# Patient Record
Sex: MALE | Race: WHITE | NOT HISPANIC OR LATINO | Employment: UNEMPLOYED | URBAN - METROPOLITAN AREA
[De-identification: names, ages, dates, MRNs, and addresses within clinical notes are randomized per-mention and may not be internally consistent; named-entity substitution may affect disease eponyms.]

---

## 2021-08-30 ENCOUNTER — OFFICE VISIT (OUTPATIENT)
Dept: INTERNAL MEDICINE | Facility: CLINIC | Age: 41
End: 2021-08-30

## 2021-08-30 VITALS
WEIGHT: 210 LBS | HEIGHT: 67 IN | HEART RATE: 67 BPM | BODY MASS INDEX: 32.96 KG/M2 | SYSTOLIC BLOOD PRESSURE: 130 MMHG | RESPIRATION RATE: 16 BRPM | OXYGEN SATURATION: 98 % | DIASTOLIC BLOOD PRESSURE: 84 MMHG | TEMPERATURE: 97.5 F

## 2021-08-30 DIAGNOSIS — E66.09 CLASS 1 OBESITY DUE TO EXCESS CALORIES WITHOUT SERIOUS COMORBIDITY WITH BODY MASS INDEX (BMI) OF 32.0 TO 32.9 IN ADULT: ICD-10-CM

## 2021-08-30 DIAGNOSIS — F41.9 ANXIETY: ICD-10-CM

## 2021-08-30 DIAGNOSIS — R03.0 ELEVATED BLOOD PRESSURE READING: ICD-10-CM

## 2021-08-30 DIAGNOSIS — M25.561 LATERAL KNEE PAIN, RIGHT: Primary | ICD-10-CM

## 2021-08-30 PROCEDURE — 99203 OFFICE O/P NEW LOW 30 MIN: CPT | Performed by: NURSE PRACTITIONER

## 2021-08-30 RX ORDER — CITALOPRAM 20 MG/1
20 TABLET ORAL DAILY
COMMUNITY
Start: 2021-07-27 | End: 2021-10-04 | Stop reason: SDUPTHER

## 2021-08-30 RX ORDER — BUPRENORPHINE AND NALOXONE 8; 2 MG/1; MG/1
FILM, SOLUBLE BUCCAL; SUBLINGUAL DAILY
COMMUNITY
Start: 2021-08-24

## 2021-08-30 NOTE — PATIENT INSTRUCTIONS
Acute Knee Pain, Adult  Acute knee pain is sudden and may be caused by damage, swelling, or irritation of the muscles and tissues that support your knee. The injury may result from:  · A fall.  · An injury to your knee from twisting motions.  · A hit to the knee.  · Infection.  Acute knee pain may go away on its own with time and rest. If it does not, your health care provider may order tests to find the cause of the pain. These may include:  · Imaging tests, such as an X-ray, MRI, or ultrasound.  · Joint aspiration. In this test, fluid is removed from the knee.  · Arthroscopy. In this test, a lighted tube is inserted into the knee and an image is projected onto a TV screen.  · Biopsy. In this test, a sample of tissue is removed from the body and studied under a microscope.  Follow these instructions at home:  Pay attention to any changes in your symptoms. Take these actions to relieve your pain.  If you have a knee sleeve or brace:    · Wear the sleeve or brace as told by your health care provider. Remove it only as told by your health care provider.  · Loosen the sleeve or brace if your toes tingle, become numb, or turn cold and blue.  · Keep the sleeve or brace clean.  · If the sleeve or brace is not waterproof:  ? Do not let it get wet.  ? Cover it with a watertight covering when you take a bath or shower.  Activity  · Rest your knee.  · Do not do things that cause pain or make pain worse.  · Avoid high-impact activities or exercises, such as running, jumping rope, or doing jumping jacks.  · Work with a physical therapist to make a safe exercise program, as recommended by your health care provider. Do exercises as told by your physical therapist.  Managing pain, stiffness, and swelling    · If directed, put ice on the knee:  ? Put ice in a plastic bag.  ? Place a towel between your skin and the bag.  ? Leave the ice on for 20 minutes, 2-3 times a day.  · If directed, use an elastic bandage to put pressure  (compression) on your injured knee. This may control swelling, give support, and help with discomfort.  General instructions  · Take over-the-counter and prescription medicines only as told by your health care provider.  · Raise (elevate) your knee above the level of your heart when you are sitting or lying down.  · Sleep with a pillow under your knee.  · Do not use any products that contain nicotine or tobacco, such as cigarettes, e-cigarettes, and chewing tobacco. These can delay healing. If you need help quitting, ask your health care provider.  · If you are overweight, work with your health care provider and a dietitian to set a weight-loss goal that is healthy and reasonable for you. Extra weight can put pressure on your knee.  · Keep all follow-up visits as told by your health care provider. This is important.  Contact a health care provider if:  · Your knee pain continues, changes, or gets worse.  · You have a fever along with knee pain.  · Your knee feels warm to the touch.  · Your knee gonzalez or locks up.  Get help right away if:  · Your knee swells, and the swelling becomes worse.  · You cannot move your knee.  · You have severe pain in your knee.  Summary  · Acute knee pain can be caused by a fall, an injury, an infection, or damage, swelling, or irritation of the tissues that support your knee.  · Your health care provider may perform tests to find out the cause of the pain.  · Pay attention to any changes in your symptoms. Relieve your pain with rest, medicines, light activity, and use of ice.  · Get help if your pain continues or becomes worse, your knee swells, or you cannot move your knee.  This information is not intended to replace advice given to you by your health care provider. Make sure you discuss any questions you have with your health care provider.  Document Revised: 05/30/2019 Document Reviewed: 05/30/2019  [employer] Patient Education © 2021 [employer]    Obesity, Adult  Obesity is  having too much body fat. Being obese means that your weight is more than what is healthy for you.  BMI is a number that explains how much body fat you have. If you have a BMI of 30 or more, you are obese. Obesity is often caused by eating or drinking more calories than your body uses. Changing your lifestyle can help you lose weight.  Obesity can cause serious health problems, such as:  · Stroke.  · Coronary artery disease (CAD).  · Type 2 diabetes.  · Some types of cancer, including cancers of the colon, breast, uterus, and gallbladder.  · Osteoarthritis.  · High blood pressure (hypertension).  · High cholesterol.  · Sleep apnea.  · Gallbladder stones.  · Infertility problems.  What are the causes?  · Eating meals each day that are high in calories, sugar, and fat.  · Being born with genes that may make you more likely to become obese.  · Having a medical condition that causes obesity.  · Taking certain medicines.  · Sitting a lot (having a sedentary lifestyle).  · Not getting enough sleep.  · Drinking a lot of drinks that have sugar in them.  What increases the risk?  · Having a family history of obesity.  · Being an  woman.  · Being a  man.  · Living in an area with limited access to:  ? Fine, recreation centers, or sidewalks.  ? Healthy food choices, such as grocery stores and [employer] markets.  What are the signs or symptoms?  The main sign is having too much body fat.  How is this treated?  · Treatment for this condition often includes changing your lifestyle. Treatment may include:  ? Changing your diet. This may include making a healthy meal plan.  ? Exercise. This may include activity that causes your heart to beat faster (aerobic exercise) and strength training. Work with your doctor to design a program that works for you.  ? Medicine to help you lose weight. This may be used if you are not able to lose 1 pound a week after 6 weeks of healthy eating and more exercise.  ? Treating  conditions that cause the obesity.  ? Surgery. Options may include gastric banding and gastric bypass. This may be done if:  § Other treatments have not helped to improve your condition.  § You have a BMI of 40 or higher.  § You have life-threatening health problems related to obesity.  Follow these instructions at home:  Eating and drinking    · Follow advice from your doctor about what to eat and drink. Your doctor may tell you to:  ? Limit fast food, sweets, and processed snack foods.  ? Choose low-fat options. For example, choose low-fat milk instead of whole milk.  ? Eat 5 or more servings of fruits or vegetables each day.  ? Eat at home more often. This gives you more control over what you eat.  ? Choose healthy foods when you eat out.  ? Learn to read food labels. This will help you learn how much food is in 1 serving.  ? Keep low-fat snacks available.  ? Avoid drinks that have a lot of sugar in them. These include soda, fruit juice, iced tea with sugar, and flavored milk.  · Drink enough water to keep your pee (urine) pale yellow.  · Do not go on fad diets.  Physical activity  · Exercise often, as told by your doctor. Most adults should get up to 150 minutes of moderate-intensity exercise every week.Ask your doctor:  ? What types of exercise are safe for you.  ? How often you should exercise.  · Warm up and stretch before being active.  · Do slow stretching after being active (cool down).  · Rest between times of being active.  Lifestyle  · Work with your doctor and a food expert (dietitian) to set a weight-loss goal that is best for you.  · Limit your screen time.  · Find ways to reward yourself that do not involve food.  · Do not drink alcohol if:  ? Your doctor tells you not to drink.  ? You are pregnant, may be pregnant, or are planning to become pregnant.  · If you drink alcohol:  ? Limit how much you use to:  § 0-1 drink a day for women.  § 0-2 drinks a day for men.  ? Be aware of how much alcohol is  in your drink. In the [place] one drink equals one 12 oz bottle of beer (355 mL), one 5 oz glass of wine (148 mL), or one 1½ oz glass of hard liquor (44 mL).  General instructions  · Keep a weight-loss journal. This can help you keep track of:  ? The food that you eat.  ? How much exercise you get.  · Take over-the-counter and prescription medicines only as told by your doctor.  · Take vitamins and supplements only as told by your doctor.  · Think about joining a support group.  · Keep all follow-up visits as told by your doctor. This is important.  Contact a doctor if:  · You cannot meet your weight loss goal after you have changed your diet and lifestyle for 6 weeks.  Get help right away if you:  · Are having trouble breathing.  · Are having thoughts of harming yourself.  Summary  · Obesity is having too much body fat.  · Being obese means that your weight is more than what is healthy for you.  · Work with your doctor to set a weight-loss goal.  · Get regular exercise as told by your doctor.  This information is not intended to replace advice given to you by your health care provider. Make sure you discuss any questions you have with your health care provider.  Document Revised: 08/22/2019 Document Reviewed: 08/22/2019  Elsevier Patient Education © 2021 [employer]

## 2021-08-30 NOTE — PROGRESS NOTES
New Patient Office Visit      Patient Name: Abhishek Brand  : 1980   MRN: [id number]     Chief Complaint:    Chief Complaint   Patient presents with   • Establish Care     New patient    • Knee Pain     Putting in yannick, felt a pop, immediate pain - On Thursday    • Anxiety     Worsening when lays down at night        History of Present Illness: Abhishek Brand is a 41 y.o. male presents to clinic today to establish care.  He was a former smoker quit 5 years ago.  He presents with a history of anxiety and substance abuse.  He has been on Suboxone for approximately 2 years and has been doing well.  He describes feeling anxious more at night while trying to to fall asleep. This usually goes away within minutes.  Has noticed a fast heart rate at this time.  He does not experience experience any chest pain or palpitations while during the day when he is active.  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  Alleviating factors include ibuprofen, ice, ace bandage and he has been wearing a  knee brace.  He states that the pain is improving.  Associated symptoms are slight swelling of the knee, lateral pain of knee, slight swelling in his calf, and difficulty with extension.  He has a history of Osgood -Schlatter's disease in that knee.  Pain is rated as a 2 and described as sore.     Subjective     Review of System: Review of Systems   Constitutional: Negative for activity change, appetite change, chills, fatigue, fever and unexpected weight change.   HENT: Negative for congestion, ear pain, postnasal drip, rhinorrhea, sinus pressure, sinus pain, sneezing and sore throat.    Eyes: Negative for visual disturbance.   Respiratory: Negative for cough, shortness of breath and wheezing.    Cardiovascular: Negative for chest pain and palpitations.   Gastrointestinal: Negative for  abdominal pain, blood in stool, constipation, diarrhea, nausea and vomiting.   Genitourinary: Negative for dysuria.   Musculoskeletal: Positive for joint swelling. Negative for arthralgias and myalgias.   Skin: Negative for rash.   Neurological: Negative for dizziness, weakness and headaches.   Hematological: Negative for adenopathy.   Psychiatric/Behavioral: Negative for dysphoric mood. The patient is nervous/anxious.       I have reviewed the ROS documented by my clinical staff, updated appropriately and I agree. ANAYELI Padilla    Past Medical History:   Past Medical History:   Diagnosis Date   • Anxiety    • Depression        Past Surgical History:   Past Surgical History:   Procedure Laterality Date   • APPENDECTOMY     • CHOLECYSTECTOMY         Family History:   Family History   Problem Relation Age of Onset   • Cancer Father         Throat    • Colon polyps Sister    • Cancer Paternal Uncle         Colon   • Cancer Maternal Grandmother    • Heart disease Maternal Grandfather        Social History:   Social History     Socioeconomic History   • Marital status:      Spouse name: Not on file   • Number of children: Not on file   • Years of education: Not on file   • Highest education level: Not on file   Tobacco Use   • Smoking status: Former Smoker     Types: Cigarettes   • Smokeless tobacco: Never Used   • Tobacco comment:  Quit 5 yrs ago    Vaping Use   • Vaping Use: Never used   Substance and Sexual Activity   • Alcohol use: Not Currently     Comment: Former- Stopped around 2015   • Drug use: Not Currently     Comment: Opioids - Stopped 10/2018       Medications:     Current Outpatient Medications:   •  buprenorphine-naloxone (SUBOXONE) 8-2 MG film film, Daily., Disp: , Rfl:   •  citalopram (CeleXA) 20 MG tablet, Take 20 mg by mouth Daily., Disp: , Rfl:     Allergies:   No Known Allergies    Objective     Physical Exam:   Vital Signs:   Vitals:    08/30/21 1302   BP: 130/84   Pulse: 67   Resp:  "16   Temp: 97.5 °F (36.4 °C)   TempSrc: Infrared   SpO2: 98%   Weight: 95.3 kg (210 lb)   Height: 170.2 cm (67\")   PainSc:   2   PainLoc: Knee  Comment: Right     Body mass index is 32.89 kg/m².     Physical Exam  Constitutional:       Appearance: Normal appearance.   HENT:      Head: Normocephalic.   Eyes:      General:         Right eye: No discharge.         Left eye: No discharge.   Cardiovascular:      Rate and Rhythm: Normal rate and regular rhythm.      Heart sounds: Normal heart sounds. No murmur heard.     Pulmonary:      Breath sounds: Normal breath sounds. No wheezing, rhonchi or rales.   Abdominal:      General: Bowel sounds are normal.      Palpations: There is no mass.   Musculoskeletal:      Right lower leg: No edema.      Left lower leg: No edema.      Comments: Right lateral upper knee tenderness and swelling; pain with extension; no instability; negative madhuri sign; slight swelling of right calf   Lymphadenopathy:      Cervical: No cervical adenopathy.   Skin:     General: Skin is warm and dry.   Neurological:      General: No focal deficit present.      Mental Status: He is alert and oriented to person, place, and time.   Psychiatric:         Mood and Affect: Mood normal.         Behavior: Behavior normal.         Assessment / Plan      Assessment/Plan:   Diagnoses and all orders for this visit:    1. Lateral knee pain, right (Primary)    2. Anxiety    3. BMI 32.0-32.9,adult    4. Class 1 obesity due to excess calories without serious comorbidity with body mass index (BMI) of 32.0 to 32.9 in adult    5. Elevated blood pressure reading         Immunizations:Needs Tdap and Covid vaccine.  Discussed getting him up-to-date at next physical.    Colonoscopy: Last colonoscopy was 2 years ago.    Doing well on citalopram.  He does not need any refills today.    Discussed diet and exercise.  Will check labs at his physical.    Monitor blood pressure; limit salt.     Patient to monitor her right knee pain; " will consider x-ray and/or physical therapy if no improvement.  Also monitor right left calf swelling and let me know if this worsens becomes extremely painful.  Patient can try heat and continue anti-inflammatories.      Return in about 3 months (around 11/30/2021) for Annual.    ANAYELI Padilla  [facility]

## 2021-10-04 ENCOUNTER — TELEPHONE (OUTPATIENT)
Dept: INTERNAL MEDICINE | Facility: CLINIC | Age: 41
End: 2021-10-04

## 2021-10-04 ENCOUNTER — OFFICE VISIT (OUTPATIENT)
Dept: INTERNAL MEDICINE | Facility: CLINIC | Age: 41
End: 2021-10-04

## 2021-10-04 ENCOUNTER — LAB (OUTPATIENT)
Dept: LAB | Facility: HOSPITAL | Age: 41
End: 2021-10-04

## 2021-10-04 VITALS
HEIGHT: 68 IN | OXYGEN SATURATION: 97 % | SYSTOLIC BLOOD PRESSURE: 116 MMHG | RESPIRATION RATE: 16 BRPM | HEART RATE: 67 BPM | BODY MASS INDEX: 31.58 KG/M2 | TEMPERATURE: 97.5 F | DIASTOLIC BLOOD PRESSURE: 82 MMHG | WEIGHT: 208.4 LBS

## 2021-10-04 DIAGNOSIS — Z13.220 ENCOUNTER FOR LIPID SCREENING FOR CARDIOVASCULAR DISEASE: ICD-10-CM

## 2021-10-04 DIAGNOSIS — F32.A ANXIETY AND DEPRESSION: ICD-10-CM

## 2021-10-04 DIAGNOSIS — Z00.00 WELLNESS EXAMINATION: Primary | ICD-10-CM

## 2021-10-04 DIAGNOSIS — Z13.6 ENCOUNTER FOR LIPID SCREENING FOR CARDIOVASCULAR DISEASE: ICD-10-CM

## 2021-10-04 DIAGNOSIS — M25.561 ACUTE PAIN OF RIGHT KNEE: ICD-10-CM

## 2021-10-04 DIAGNOSIS — Z00.00 WELLNESS EXAMINATION: ICD-10-CM

## 2021-10-04 DIAGNOSIS — E66.09 CLASS 1 OBESITY DUE TO EXCESS CALORIES WITHOUT SERIOUS COMORBIDITY WITH BODY MASS INDEX (BMI) OF 31.0 TO 31.9 IN ADULT: ICD-10-CM

## 2021-10-04 DIAGNOSIS — F41.9 ANXIETY AND DEPRESSION: ICD-10-CM

## 2021-10-04 DIAGNOSIS — Z12.5 SCREENING FOR PROSTATE CANCER: ICD-10-CM

## 2021-10-04 PROBLEM — E66.811 CLASS 1 OBESITY DUE TO EXCESS CALORIES WITHOUT SERIOUS COMORBIDITY WITH BODY MASS INDEX (BMI) OF 31.0 TO 31.9 IN ADULT: Status: ACTIVE | Noted: 2021-10-04

## 2021-10-04 LAB
ALBUMIN SERPL-MCNC: 5 G/DL (ref 3.5–5.2)
ALBUMIN/GLOB SERPL: 2.3 G/DL
ALP SERPL-CCNC: 143 U/L (ref 39–117)
ALT SERPL W P-5'-P-CCNC: 79 U/L (ref 1–41)
ANION GAP SERPL CALCULATED.3IONS-SCNC: 9.1 MMOL/L (ref 5–15)
AST SERPL-CCNC: 39 U/L (ref 1–40)
BASOPHILS # BLD AUTO: 0.04 10*3/MM3 (ref 0–0.2)
BASOPHILS NFR BLD AUTO: 0.6 % (ref 0–1.5)
BILIRUB SERPL-MCNC: 0.3 MG/DL (ref 0–1.2)
BUN SERPL-MCNC: 12 MG/DL (ref 6–20)
BUN/CREAT SERPL: 9.9 (ref 7–25)
CALCIUM SPEC-SCNC: 10.1 MG/DL (ref 8.6–10.5)
CHLORIDE SERPL-SCNC: 105 MMOL/L (ref 98–107)
CHOLEST SERPL-MCNC: 169 MG/DL (ref 0–200)
CO2 SERPL-SCNC: 27.9 MMOL/L (ref 22–29)
CREAT SERPL-MCNC: 1.21 MG/DL (ref 0.76–1.27)
DEPRECATED RDW RBC AUTO: 44.7 FL (ref 37–54)
EOSINOPHIL # BLD AUTO: 0.17 10*3/MM3 (ref 0–0.4)
EOSINOPHIL NFR BLD AUTO: 2.5 % (ref 0.3–6.2)
ERYTHROCYTE [DISTWIDTH] IN BLOOD BY AUTOMATED COUNT: 13.3 % (ref 12.3–15.4)
GFR SERPL CREATININE-BSD FRML MDRD: 66 ML/MIN/1.73
GLOBULIN UR ELPH-MCNC: 2.2 GM/DL
GLUCOSE SERPL-MCNC: 92 MG/DL (ref 65–99)
HCT VFR BLD AUTO: 44.1 % (ref 37.5–51)
HDLC SERPL-MCNC: 35 MG/DL (ref 40–60)
HGB BLD-MCNC: 14.3 G/DL (ref 13–17.7)
IMM GRANULOCYTES # BLD AUTO: 0.03 10*3/MM3 (ref 0–0.05)
IMM GRANULOCYTES NFR BLD AUTO: 0.4 % (ref 0–0.5)
LDLC SERPL CALC-MCNC: 99 MG/DL (ref 0–100)
LDLC/HDLC SERPL: 2.66 {RATIO}
LYMPHOCYTES # BLD AUTO: 1.8 10*3/MM3 (ref 0.7–3.1)
LYMPHOCYTES NFR BLD AUTO: 26.9 % (ref 19.6–45.3)
MCH RBC QN AUTO: 29.5 PG (ref 26.6–33)
MCHC RBC AUTO-ENTMCNC: 32.4 G/DL (ref 31.5–35.7)
MCV RBC AUTO: 90.9 FL (ref 79–97)
MONOCYTES # BLD AUTO: 0.46 10*3/MM3 (ref 0.1–0.9)
MONOCYTES NFR BLD AUTO: 6.9 % (ref 5–12)
NEUTROPHILS NFR BLD AUTO: 4.18 10*3/MM3 (ref 1.7–7)
NEUTROPHILS NFR BLD AUTO: 62.7 % (ref 42.7–76)
NRBC BLD AUTO-RTO: 0 /100 WBC (ref 0–0.2)
PLATELET # BLD AUTO: 247 10*3/MM3 (ref 140–450)
PMV BLD AUTO: 11.6 FL (ref 6–12)
POTASSIUM SERPL-SCNC: 4.5 MMOL/L (ref 3.5–5.2)
PROT SERPL-MCNC: 7.2 G/DL (ref 6–8.5)
RBC # BLD AUTO: 4.85 10*6/MM3 (ref 4.14–5.8)
SODIUM SERPL-SCNC: 142 MMOL/L (ref 136–145)
TRIGL SERPL-MCNC: 205 MG/DL (ref 0–150)
TSH SERPL DL<=0.05 MIU/L-ACNC: 1.47 UIU/ML (ref 0.27–4.2)
VLDLC SERPL-MCNC: 35 MG/DL (ref 5–40)
WBC # BLD AUTO: 6.68 10*3/MM3 (ref 3.4–10.8)

## 2021-10-04 PROCEDURE — 84443 ASSAY THYROID STIM HORMONE: CPT

## 2021-10-04 PROCEDURE — 80053 COMPREHEN METABOLIC PANEL: CPT

## 2021-10-04 PROCEDURE — 85025 COMPLETE CBC W/AUTO DIFF WBC: CPT

## 2021-10-04 PROCEDURE — 80061 LIPID PANEL: CPT

## 2021-10-04 PROCEDURE — 99396 PREV VISIT EST AGE 40-64: CPT | Performed by: NURSE PRACTITIONER

## 2021-10-04 RX ORDER — CITALOPRAM 20 MG/1
20 TABLET ORAL DAILY
Qty: 90 TABLET | Refills: 4 | Status: SHIPPED | OUTPATIENT
Start: 2021-10-04 | End: 2022-07-11

## 2021-10-04 NOTE — PROGRESS NOTES
Male Physical Note      Patient Name: Abhishek Brand  : 1980   MRN: [id number]     Chief Complaint:    Chief Complaint   Patient presents with   • Annual Exam   • Knee Pain     right       History of Present Illness: Abhishek Brand is a 41 y.o. male who is here today for their annual health maintenance and physical.      Anxiety: Doing well on citalopram. History of opoid dependency in remission with suboxone therapy.     Palpitations: Patient states he has a  fast heart rate when he lies down. Improves on its own and then he falls asleep. States his caffeine in take is several cups of coffee in the morning and a caffeinated soda in the afternoon. He does not experience experience any chest pain or palpitations while active.    Right knee pain: complains of some instability at times especially with lateral motions; knee brace helps.         Subjective      Review of Systems:   Review of Systems   Constitutional: Negative for activity change, appetite change, fatigue, fever, unexpected weight gain and unexpected weight loss.   HENT: Negative for congestion.    Eyes: Negative for visual disturbance.   Respiratory: Negative for cough, chest tightness, shortness of breath and wheezing.         Heart racing when trying to go to sleep.    Cardiovascular: Positive for palpitations. Negative for chest pain and leg swelling.   Gastrointestinal: Negative for abdominal pain, constipation, diarrhea, nausea, rectal pain, vomiting, GERD and indigestion.   Endocrine: Negative for cold intolerance, heat intolerance, polydipsia, polyphagia and polyuria.   Genitourinary: Negative for dysuria, frequency, hematuria, nocturia, erectile dysfunction, testicular pain and urgency.   Musculoskeletal: Positive for arthralgias. Negative for joint swelling.        Right knee pain   Skin: Negative for rash.   Neurological: Negative for headache.   Psychiatric/Behavioral: Positive for sleep disturbance. Negative for depressed mood. The  patient is not nervous/anxious.      Caffeine: coffee and one soda; none after 4 pm  Sleep: 7 hours  Exercise: 1-2 hours/day with  push-ups and squats      Past Medical History:   Past Medical History:   Diagnosis Date   • Anxiety    • Depression        Past Surgical History:   Past Surgical History:   Procedure Laterality Date   • APPENDECTOMY     • CHOLECYSTECTOMY         Family History:   Family History   Problem Relation Age of Onset   • Cancer Father         Throat    • Colon polyps Sister    • Cancer Paternal Uncle         Colon   • Cancer Maternal Grandmother    • Heart disease Maternal Grandfather        Social History:   Social History     Socioeconomic History   • Marital status:      Spouse name: Not on file   • Number of children: Not on file   • Years of education: Not on file   • Highest education level: Not on file   Tobacco Use   • Smoking status: Former Smoker     Types: Cigarettes   • Smokeless tobacco: Never Used   • Tobacco comment:  Quit 5 yrs ago    Vaping Use   • Vaping Use: Never used   Substance and Sexual Activity   • Alcohol use: Not Currently     Comment: Former- Stopped around 2015   • Drug use: Not Currently     Types: Oxycodone     Comment: Opioids - Stopped 10/2018   • Sexual activity: Yes     Partners: Female     Birth control/protection: Partner's vasectomy       Medications:     Current Outpatient Medications:   •  buprenorphine-naloxone (SUBOXONE) 8-2 MG film film, Daily., Disp: , Rfl:   •  citalopram (CeleXA) 20 MG tablet, Take 20 mg by mouth Daily., Disp: , Rfl:     Allergies:   No Known Allergies    Immunizations:  Td/Tdap(Booster Q 10 yrs):  Decline  Flu (Yearly):  Decline  Pneumovax (1 yr after Prevnar):  n/a  [id number] (1 yr after Pneumo):n/a  Colorectal Screening: Had a colonoscopy 3 years: [facility] /f/u 5 years; sister had a history of polps   Last Completed Colonoscopy     This patient has no relevant Health Maintenance data.         CT for Smoker (Age  "55-75, 30pk yr): n/a  Bone Density/DEXA: n/a   Hep C ( 4102-8827):  Declined  PSA(Over age 50):  N/a  US Aorta (For male smokers, age 65):  n/a    Diet/Physical activity:Exercise: 1-2 hours push-ups and squats    Sexual health: active  Depression: PHQ-2 Depression Screening PHQ-2 Depression Screening  Little interest or pleasure in doing things? 0   Feeling down, depressed, or hopeless? 0   PHQ-2 Total Score 0      0    0    0          Objective     Physical Exam:  Vital Signs:   Vitals:    10/04/21 0914   BP: 116/82   BP Location: Right arm   Patient Position: Sitting   Cuff Size: Adult   Pulse: 67   Resp: 16   Temp: 97.5 °F (36.4 °C)   TempSrc: Infrared   SpO2: 97%   Weight: 94.5 kg (208 lb 6.4 oz)   Height: 172.1 cm (67.75\")   PainSc: 0-No pain     Body mass index is 31.92 kg/m². Patient's Body mass index is 31.92 kg/m². indicating that he is obese (BMI >30). Obesity-related health conditions include the following: none. Obesity is newly identified. BMI is is above average; BMI management plan is completed. We discussed portion control and increasing exercise..      Physical Exam  Vitals and nursing note reviewed.   Constitutional:       General: He is not in acute distress.     Appearance: Normal appearance. He is not ill-appearing.   HENT:      Head: Normocephalic.      Right Ear: There is no impacted cerumen.      Left Ear: There is no impacted cerumen.      Nose: No septal deviation, nasal tenderness or rhinorrhea.      Mouth/Throat:      Mouth: Mucous membranes are moist.      Pharynx: Oropharynx is clear. No oropharyngeal exudate or posterior oropharyngeal erythema.   Eyes:      General:         Right eye: No discharge.         Left eye: No discharge.      Extraocular Movements: Extraocular movements intact.      Conjunctiva/sclera: Conjunctivae normal.      Pupils: Pupils are equal, round, and reactive to light.   Neck:      Thyroid: No thyromegaly.      Vascular: No carotid bruit.   Cardiovascular:     "  Rate and Rhythm: Normal rate and regular rhythm.      Pulses: Normal pulses.      Heart sounds: Normal heart sounds. No murmur heard.   No gallop.    Pulmonary:      Effort: Pulmonary effort is normal.      Breath sounds: Normal breath sounds. No wheezing, rhonchi or rales.   Abdominal:      General: Bowel sounds are normal.      Palpations: Abdomen is soft. There is no mass.      Tenderness: There is no abdominal tenderness. There is no right CVA tenderness, left CVA tenderness or rebound.   Genitourinary:     Comments: declined  Musculoskeletal:         General: No swelling or tenderness.      Cervical back: Normal range of motion.      Right lower leg: No edema.      Left lower leg: No edema.      Comments: No tenderness in right knee; no instability; full ROM   Lymphadenopathy:      Cervical: No cervical adenopathy.   Skin:     General: Skin is warm and dry.      Capillary Refill: Capillary refill takes less than 2 seconds.      Findings: No erythema or rash.   Neurological:      General: No focal deficit present.      Mental Status: He is alert and oriented to person, place, and time.      Motor: No weakness.   Psychiatric:         Mood and Affect: Mood normal.         Behavior: Behavior is cooperative.         Thought Content: Thought content normal.         Cognition and Memory: He does not exhibit impaired recent memory.         Judgment: Judgment normal.         Procedures    Assessment / Plan      Assessment/Plan:   Diagnoses and all orders for this visit:    1. Wellness examination (Primary)  -     Comprehensive Metabolic Panel; Future  -     TSH; Future  -     CBC & Differential; Future    2. Anxiety and depression  Continue medications  Citalopram 20 mg daily     3.  Screening for prostate cancer  PSA    5. Encounter for lipid screening for cardiovascular disease  Lipid panel    6. Right knee pain: will consider xray or PT if no improvement     7. BMI 31; goal bmi 25    8. Obesity: discussed exercise  and diet     Follow Up:   1 year or sooner if worsening      Healthcare Maintenance:   Counseling provided on flu, tdap, and covid vaccines, diet and exercise, weight loss, dental and eye exams.   Abhishek Brand voices understanding and acceptance of this advice and will call back with any further questions or concerns. AVS with preventive healthcare tips printed for patient.     ANAYELI Padilla   [facility]

## 2022-06-15 ENCOUNTER — TELEPHONE (OUTPATIENT)
Dept: INTERNAL MEDICINE | Facility: CLINIC | Age: 42
End: 2022-06-15

## 2022-06-15 NOTE — TELEPHONE ENCOUNTER
Caller: NILSA DAVIS    Relationship: Emergency Contact    Best call back number: [phone]    Who is your current provider: KELSEA MICHEL    Who would you like your new provider to be: DR. RODRÍGUEZ     What are your reasons for transferring care: PATIENT PREFERENCE    Additional notes: PATIENT IS ASKING TO BE SCHEDULED FOR SIDE PAIN AND RECHECK LABS

## 2022-06-15 NOTE — TELEPHONE ENCOUNTER
Patient's wife states he didn't have a good experience with Jossie and she talked to Dr Reddy and Dr Reddy agreed to see him. Patient's wife see's Dr Reddy.

## 2022-07-11 ENCOUNTER — OFFICE VISIT (OUTPATIENT)
Dept: INTERNAL MEDICINE | Facility: CLINIC | Age: 42
End: 2022-07-11

## 2022-07-11 VITALS
HEART RATE: 78 BPM | BODY MASS INDEX: 31.13 KG/M2 | OXYGEN SATURATION: 99 % | RESPIRATION RATE: 18 BRPM | SYSTOLIC BLOOD PRESSURE: 122 MMHG | DIASTOLIC BLOOD PRESSURE: 76 MMHG | HEIGHT: 68 IN | WEIGHT: 205.4 LBS | TEMPERATURE: 97.2 F

## 2022-07-11 DIAGNOSIS — N30.01 ACUTE CYSTITIS WITH HEMATURIA: Primary | ICD-10-CM

## 2022-07-11 DIAGNOSIS — L40.9 PSORIASIS: ICD-10-CM

## 2022-07-11 DIAGNOSIS — N39.9 URINARY PROBLEM IN MALE: ICD-10-CM

## 2022-07-11 DIAGNOSIS — R31.9 HEMATURIA, UNSPECIFIED TYPE: ICD-10-CM

## 2022-07-11 DIAGNOSIS — G47.00 INSOMNIA, UNSPECIFIED TYPE: ICD-10-CM

## 2022-07-11 LAB
BACTERIA UR QL AUTO: NORMAL /HPF
BILIRUB BLD-MCNC: NEGATIVE MG/DL
CLARITY, POC: CLEAR
COLOR UR: YELLOW
EXPIRATION DATE: ABNORMAL
GLUCOSE UR STRIP-MCNC: NEGATIVE MG/DL
HYALINE CASTS UR QL AUTO: NORMAL /LPF
KETONES UR QL: NEGATIVE
LEUKOCYTE EST, POC: NEGATIVE
Lab: ABNORMAL
NITRITE UR-MCNC: NEGATIVE MG/ML
PH UR: 7 [PH] (ref 5–8)
PROT UR STRIP-MCNC: NEGATIVE MG/DL
RBC # UR STRIP: ABNORMAL /UL
RBC # UR STRIP: NORMAL /HPF
REF LAB TEST METHOD: NORMAL
SP GR UR: 1 (ref 1–1.03)
SQUAMOUS #/AREA URNS HPF: NORMAL /HPF
UROBILINOGEN UR QL: NORMAL
WBC # UR STRIP: NORMAL /HPF

## 2022-07-11 PROCEDURE — 81015 MICROSCOPIC EXAM OF URINE: CPT | Performed by: NURSE PRACTITIONER

## 2022-07-11 PROCEDURE — 99213 OFFICE O/P EST LOW 20 MIN: CPT | Performed by: NURSE PRACTITIONER

## 2022-07-11 PROCEDURE — 81003 URINALYSIS AUTO W/O SCOPE: CPT | Performed by: NURSE PRACTITIONER

## 2022-07-11 RX ORDER — HYDROXYZINE HYDROCHLORIDE 25 MG/1
TABLET, FILM COATED ORAL
Qty: 60 TABLET | Refills: 0 | Status: SHIPPED | OUTPATIENT
Start: 2022-07-11 | End: 2022-10-27 | Stop reason: ALTCHOICE

## 2022-07-11 RX ORDER — SULFAMETHOXAZOLE AND TRIMETHOPRIM 800; 160 MG/1; MG/1
1 TABLET ORAL 2 TIMES DAILY
Qty: 20 TABLET | Refills: 0 | Status: SHIPPED | OUTPATIENT
Start: 2022-07-11 | End: 2022-09-13

## 2022-07-11 NOTE — PROGRESS NOTES
"Patient Name: Abhishek Brand  : 1980   MRN: [id number]     Chief Complaint:    Chief Complaint   Patient presents with   • Abdominal Pain     Right lower abdominal pain. Seen at [facility] 22   • Anxiety     At night     • rash on leg   • urinary problem       History of Present Illness: Abhishek Brand is a 42 y.o. male presents to clinic for right lower abdominal pain. He was seen at [facility] 22. He was told he had blood in urine. He continues to have slight RLQ pain.  He describes the pain as an ache.  Initially had nausea, vomiting and diarrhea.  The nausea vomiting and diarrhea have improved.  Associated symptoms are straining to urinate.  Patient states he has to strain to urinate for one year and seems to be worsening. He has slight burning, frequency, and hesitancy.  I will request CT results.    Patient has had anxiety years. Symptoms worsening at night. States he has \" mini panic attacks \" when he tries to go to sleep.  Other symptoms are heart racing and mind wandering.  He has tried citalopram in the past and it did not help.    Patient has several dry and callused areas on elbows and right lower leg.  Occasionally these become red and itchy.  He would like a referral to dermatology.     Subjective     Review of System: Review of Systems   Constitutional: Negative for fatigue and fever.   HENT: Negative for congestion and rhinorrhea.    Respiratory: Negative for shortness of breath and wheezing.    Cardiovascular: Negative for chest pain and palpitations.   Gastrointestinal: Positive for abdominal pain (RLQ), diarrhea and nausea (initially). Negative for vomiting.   Genitourinary: Positive for dysuria and frequency. Negative for decreased urine volume, flank pain, hematuria, scrotal swelling, testicular pain and urgency.   Musculoskeletal: Negative for myalgias.   Skin: Negative for rash.        Medications:     Current Outpatient Medications:   •  buprenorphine-naloxone (SUBOXONE) 8-2 MG " "film film, Daily., Disp: , Rfl:   •  hydrOXYzine (ATARAX) 25 MG tablet, Take 1-2 tablets qhs for anxiety, Disp: 60 tablet, Rfl: 0  •  sulfamethoxazole-trimethoprim (BACTRIM DS,SEPTRA DS) 800-160 MG per tablet, Take 1 tablet by mouth 2 (Two) Times a Day., Disp: 20 tablet, Rfl: 0    Allergies:   No Known Allergies    Objective     Physical Exam:   Vital Signs:   Vitals:    07/11/22 1016   BP: 122/76   BP Location: Right arm   Patient Position: Sitting   Cuff Size: Adult   Pulse: 78   Resp: 18   Temp: 97.2 °F (36.2 °C)   TempSrc: Infrared   SpO2: 99%   Weight: 93.2 kg (205 lb 6.4 oz)   Height: 172.1 cm (67.75\")   PainSc:   2           Physical Exam  Constitutional:       General: He is not in acute distress.     Appearance: He is not ill-appearing.   HENT:      Head: Normocephalic.   Cardiovascular:      Rate and Rhythm: Normal rate and regular rhythm.      Heart sounds: Normal heart sounds. No murmur heard.  Pulmonary:      Breath sounds: Normal breath sounds.   Abdominal:      General: Bowel sounds are normal.      Tenderness: There is no abdominal tenderness.   Musculoskeletal:         General: No swelling or tenderness. Normal range of motion.   Lymphadenopathy:      Cervical: No cervical adenopathy.   Skin:     General: Skin is warm and dry.   Neurological:      General: No focal deficit present.      Mental Status: He is oriented to person, place, and time.   Psychiatric:         Mood and Affect: Mood normal.         Assessment / Plan      Assessment/Plan:   Diagnoses and all orders for this visit:    1. Insomnia, unspecified type   -     hydrOXYzine (ATARAX) 25 MG tablet; Take 1-2 tablets qhs for anxiety  Dispense: 60 tablet; Refill: 0    2. Urinary problem in male  -     Ambulatory Referral to Urology  -     POC Urinalysis Dipstick, Automated    3. Dry skin (possible psoriasis)  Patient to try aquafor.   -     Ambulatory Referral to Dermatology    4. Hematuria, unspecified type  -     Urinalysis, Microscopic " Only - Urine, Clean Catch    5. Acute cystitis with hematuria (Primary)   -     sulfamethoxazole-trimethoprim (BACTRIM DS,SEPTRA DS) 800-160 MG per tablet; Take 1 tablet by mouth 2 (Two) Times a Day.  Dispense: 20 tablet; Refill: 0       Explained and discussed patient's condition and plan of care.  Discussed when to follow-up.  Discussed possible red flags and how to follow-up with those.  Viewed patient's medications and discussed common side effects. Patient to continue current medications as advised.  Be compliant with medications. Patient to let me know if they have any worsening, no improvement, does not tolerate medication, or any future concerns about treatment. ER for emergencies.  Patient verbalized an understanding and agreement with plan of care.        Follow Up:   Return if symptoms worsen or fail to improve.    ANAYELI Padilla  [facility] [place] [facility]

## 2022-08-29 ENCOUNTER — OFFICE VISIT (OUTPATIENT)
Dept: UROLOGY | Facility: CLINIC | Age: 42
End: 2022-08-29

## 2022-08-29 VITALS — HEIGHT: 68 IN | WEIGHT: 208 LBS | BODY MASS INDEX: 31.52 KG/M2

## 2022-08-29 DIAGNOSIS — R39.9 LOWER URINARY TRACT SYMPTOMS (LUTS): ICD-10-CM

## 2022-08-29 DIAGNOSIS — N39.9 URINARY PROBLEM IN MALE: ICD-10-CM

## 2022-08-29 DIAGNOSIS — R31.29 MICROSCOPIC HEMATURIA: Primary | ICD-10-CM

## 2022-08-29 LAB
BILIRUB BLD-MCNC: NEGATIVE MG/DL
CLARITY, POC: CLEAR
COLOR UR: YELLOW
EXPIRATION DATE: ABNORMAL
GLUCOSE UR STRIP-MCNC: NEGATIVE MG/DL
KETONES UR QL: NEGATIVE
LEUKOCYTE EST, POC: NEGATIVE
Lab: ABNORMAL
NITRITE UR-MCNC: NEGATIVE MG/ML
PH UR: 6 [PH] (ref 5–8)
PROT UR STRIP-MCNC: NEGATIVE MG/DL
RBC # UR STRIP: ABNORMAL /UL
SP GR UR: 1.02 (ref 1–1.03)
UROBILINOGEN UR QL: NORMAL

## 2022-08-29 PROCEDURE — 81001 URINALYSIS AUTO W/SCOPE: CPT | Performed by: STUDENT IN AN ORGANIZED HEALTH CARE EDUCATION/TRAINING PROGRAM

## 2022-08-29 PROCEDURE — 81003 URINALYSIS AUTO W/O SCOPE: CPT | Performed by: STUDENT IN AN ORGANIZED HEALTH CARE EDUCATION/TRAINING PROGRAM

## 2022-08-29 PROCEDURE — 99204 OFFICE O/P NEW MOD 45 MIN: CPT | Performed by: STUDENT IN AN ORGANIZED HEALTH CARE EDUCATION/TRAINING PROGRAM

## 2022-08-29 PROCEDURE — 51798 US URINE CAPACITY MEASURE: CPT | Performed by: STUDENT IN AN ORGANIZED HEALTH CARE EDUCATION/TRAINING PROGRAM

## 2022-08-29 RX ORDER — TAMSULOSIN HYDROCHLORIDE 0.4 MG/1
1 CAPSULE ORAL DAILY
Qty: 30 CAPSULE | Refills: 0 | Status: SHIPPED | OUTPATIENT
Start: 2022-08-29 | End: 2022-09-13 | Stop reason: SINTOL

## 2022-08-29 NOTE — PROGRESS NOTES
"     LUTS Male Office Visit      Patient Name: Abhishek Brand  : 1980   MRN: [id number]     Chief Complaint:  Lower Urinary Tract Symptoms.   Chief Complaint   Patient presents with   • microscopic hematuria   • Difficulty Urinating        Referring Provider: Jossie Bear APRN    History of Present Illness: Mr. Brand is a 42 y.o. male with history of lower urinary tract symptoms. He thinks a vasectomy that he had performed at  4 years prior made his urinary symptoms worse. Reports intermittent right sided groin pain/abdominal pain.     In regards to urinary symptoms, he reports a weak stream and feels he needs to strain to urinate. He does wake at night 1-2x nightly to urinate.     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 Patient does not drink alcohol, has been trying to cut back on caffeine intake. Has been trying to drink more water.     IPSS 14, reports being dissatisfied. He reports straining to help a friend laying some yannick and developed some back pain which resolved, not sure if related to urinary symptoms.     Had a CT scan in 2016 at  for difficulty urinating, fever, night sweats, reported weight loss. CT was \"normal\", read is minimal, and images are not available. Patient reports he thinks this was all anxiety related.     He reports baseline anxiety. Previously seeing a therapist/psychologist for anxiety. He thinks urine symptoms may be related.     Former smoker, has multiple dipstick urinalysis with positive blood, no microscopic UA performed.     Patient has incomplete bladder emptying,  mL.  UA positive for blood on dipstick.      Subjective      Review of System: Review of Systems   Constitutional: Negative for chills, fatigue, fever and unexpected weight change.   HENT: Negative for sore throat.    Eyes: Negative for visual disturbance.   Respiratory: Negative for cough, chest tightness and shortness of breath.    Cardiovascular: Negative for chest pain and leg " swelling.   Gastrointestinal: Negative for blood in stool, constipation, diarrhea, nausea, rectal pain and vomiting.   Genitourinary: Positive for difficulty urinating and hematuria. Negative for decreased urine volume, dysuria, enuresis, flank pain, frequency, genital sores and urgency.   Musculoskeletal: Negative for back pain and joint swelling.   Skin: Negative for rash and wound.   Neurological: Negative for seizures, speech difficulty, weakness and headaches.   Psychiatric/Behavioral: Negative for confusion, sleep disturbance and suicidal ideas. The patient is not nervous/anxious.       I have reviewed the ROS documented by my clinical staff, I have updated appropriately and I agree. Randy Jackson MD    Past Medical History:  Past Medical History:   Diagnosis Date   • Anxiety    • Depression        Past Surgical History:  Past Surgical History:   Procedure Laterality Date   • APPENDECTOMY     • CHOLECYSTECTOMY         Medications:    Current Outpatient Medications:   •  buprenorphine-naloxone (SUBOXONE) 8-2 MG film film, Daily., Disp: , Rfl:   •  hydrOXYzine (ATARAX) 25 MG tablet, Take 1-2 tablets qhs for anxiety, Disp: 60 tablet, Rfl: 0  •  sulfamethoxazole-trimethoprim (BACTRIM DS,SEPTRA DS) 800-160 MG per tablet, Take 1 tablet by mouth 2 (Two) Times a Day., Disp: 20 tablet, Rfl: 0  •  tamsulosin (FLOMAX) 0.4 MG capsule 24 hr capsule, Take 1 capsule by mouth Daily., Disp: 30 capsule, Rfl: 0    Allergies:  No Known Allergies    Social History:  Social History     Socioeconomic History   • Marital status:    Tobacco Use   • Smoking status: Former Smoker     Packs/day: 0.50     Years: 10.00     Pack years: 5.00     Types: Cigarettes     Quit date: 2020     Years since quittin.1   • Smokeless tobacco: Never Used   • Tobacco comment:  Quit 5 yrs ago    Vaping Use   • Vaping Use: Never used   Substance and Sexual Activity   • Alcohol use: Not Currently     Comment: Former- Stopped around     • Drug use: Not Currently     Types: Oxycodone     Comment: Opioids - Stopped 10/2018   • Sexual activity: Yes     Partners: Female     Birth control/protection: Partner's vasectomy       Family History:  Family History   Problem Relation Age of Onset   • Cancer Father         Throat    • Colon polyps Sister    • Cancer Paternal Uncle         Colon   • Cancer Maternal Grandmother    • Heart disease Maternal Grandfather        IPSS Questionnaire (AUA-7):  Over the past month…    1)  Incomplete Emptying:       How often have you had a sensation of not emptying you had the sensation of not emptying your bladder completely after you finished urinating?  2 - Less than half the time   2)  Frequency:       How often have you had the urinate again less than two hours after you finished urinating?  1 - Less than 1 time in 5   3)  Intermittency:       How often have you found you stopped and started again several times when you urinated?   3 - About half the time   4) Urgency:      How often have you found it difficult to postpone urination?  2 - Less than half the time   5) Weak Stream:      How often have you had a weak urinary stream?  1 - Less than 1 time in 5   6) Straining:       How often have you had to push or strain to begin urination?  4 - More than half the time   7) Nocturia:      How many times did you most typically get up to urinate from the time you went to bed at night until the time you got up in the morning?  1 - 1 time   Total Score:  14   The International Prostate Symptom Score (IPSS) is used to screen, diagnose, track symptoms of benign prostatic hyperplasia (BPH).   0-7 (Mild Symptoms) 8-19 (Moderate) 20-35 (Severe)   Quality of Life (QoL):  If you were to spend the rest of your life with your urinary condition just the way it is now, how would you feel about that? 4-Mostly Dissatisfied   Urine Leakage (Incontinence) 0-No Leakage       Sexual Health Inventory for Men (PAPITO)   Over the past 6 months:  "    1. How do you rate your confidence that you could get and keep an erection?  2 - Low   2. When you had erections with sexual  stimulation, how often were your erections hard enough for penetration (entering your partner)?  4 - Most times ( much more than, half the time)   3. During sexual intercourse, how often were you able to maintain your erection after you had penetrated (entered) your partner?  5 - Almost always or always    4. During sexual intercourse, how difficult was it to maintain your erection to completion of intercourse?  4 - Sightly difficult    5. When you attempted sexual intercourse, how often was it satisfactory for you?  3 - Sometimes (About half the time)     Total Score: 18   The Sexual Health Inventory for Men further classifies ED severity with the following breakpoints:   1-7 (Severe ED) 8-11 (Moderate ED) 12-16 (Mild to Moderate ED) 17-21 (Mild ED)      Post void residual bladder scan:   130 mL    Objective     Physical Exam:   Vital Signs:   Vitals:    08/29/22 1311   Weight: 94.3 kg (208 lb)   Height: 172.1 cm (67.75\")     Body mass index is 31.86 kg/m².     Physical Exam  Constitutional:       Appearance: Normal appearance.   HENT:      Head: Normocephalic and atraumatic.      Nose: Nose normal.   Eyes:      Extraocular Movements: Extraocular movements intact.      Conjunctiva/sclera: Conjunctivae normal.      Pupils: Pupils are equal, round, and reactive to light.   Genitourinary:     Comments: Circumcised phallus, orthotopic meatus, bilaterally descended testicles without masses or lesions. RALF deferred per patient.   Musculoskeletal:         General: Normal range of motion.      Cervical back: Normal range of motion and neck supple.   Skin:     General: Skin is warm and dry.      Findings: No lesion or rash.   Neurological:      General: No focal deficit present.      Mental Status: He is alert and oriented to person, place, and time. Mental status is at baseline.   Psychiatric:   "       Mood and Affect: Mood normal.         Behavior: Behavior normal.         Labs:   No results found for: PSA    Brief Urine Lab Results  (Last result in the past 365 days)      Color   Clarity   Blood   Leuk Est   Nitrite   Protein   CREAT   Urine HCG        08/29/22 1326 Yellow   Clear   2+   Negative   Negative   Negative                      Lab Results   Component Value Date    GLUCOSE 92 10/04/2021    CALCIUM 10.1 10/04/2021     10/04/2021    K 4.5 10/04/2021    CO2 27.9 10/04/2021     10/04/2021    BUN 12 10/04/2021    CREATININE 1.21 10/04/2021    EGFRIFNONA 66 10/04/2021    BCR 9.9 10/04/2021    ANIONGAP 9.1 10/04/2021       Lab Results   Component Value Date    WBC 6.68 10/04/2021    HGB 14.3 10/04/2021    HCT 44.1 10/04/2021    MCV 90.9 10/04/2021     10/04/2021       Images:   No Images in the past 120 days found..    Measures:   Tobacco:   Abhishek Brand  reports that he quit smoking about 2 years ago. His smoking use included cigarettes. He has a 5.00 pack-year smoking history. He has never used smokeless tobacco..     Assessment / Plan      Assessment:  Mr. Brand is a 42 y.o. male who presented today with lower urinary tract symptoms.  Primary symptoms include urinary hesitancy, straining to urinate, incomplete bladder emptying.  He does have positive dipstick urinalysis for blood.  We will send microscopic urinalysis given his history of smoking.  Discussed given his young age etiologies could include prostatic obstruction or bladder neck obstruction, urethral stricture, pelvic floor dysfunction related to anxiety/psychological disorder, or unclear or idiopathic.  For complete work-up I recommend flexible cystoscopy especially given the blood in his urine.  If he has no concerning findings, we will likely consider referral to pelvic floor physical therapy for long-term pelvic relaxation management.  He has no concerning neurologic findings or spinal cord injury etc.  Neurogenic  bladder less likely.  We also discussed empiric initiation of tamsulosin which I believe would be beneficial for this patient regardless of prostatic obstruction as it may help relax his bladder neck and sphincter.  He is interested in trialing this medication.  We discussed relevant side effects risks and benefits.  Discussed retrograde ejaculation and monitoring for lightheadedness or dizziness.  I will give him a 30-day trial and see him back on 9- for cystoscopy.  This risk-benefit and alternatives to cystoscopy.    Diagnoses and all orders for this visit:    1. Microscopic hematuria (Primary)  -     POC Urinalysis Dipstick, Automated  -     Urinalysis With Microscopic - Urine, Clean Catch; Future    2.  Urinary hesitancy/straining to urinate  -Flexible cystoscopy next available, evaluate for bladder neck obstruction, prostatic obstruction, urethral stricture, microscopic hematuria work-up to rule out bladder masses given smoking history etc.    -     POC Urinalysis Dipstick, Automated    3. Lower urinary tract symptoms (LUTS)  -     tamsulosin (FLOMAX) 0.4 MG capsule 24 hr capsule; Take 1 capsule by mouth Daily.  Dispense: 30 capsule; Refill: 0       Follow Up:   Return in about 15 days (around 9/13/2022) for Flexible cystoscopy 9/13/22 .    I spent approximately 30 minutes providing clinical care for this patient; including review of patient's chart and provider documentation, face to face time spent with patient in examination room (obtaining history, performing physical exam, discussing diagnosis and management options), placing orders, and completing patient documentation.     Randy Jackson MD  [facility]

## 2022-08-30 LAB
BACTERIA UR QL AUTO: ABNORMAL /HPF
BILIRUB UR QL STRIP: NEGATIVE
CLARITY UR: CLEAR
COLOR UR: YELLOW
GLUCOSE UR STRIP-MCNC: NEGATIVE MG/DL
HGB UR QL STRIP.AUTO: ABNORMAL
HYALINE CASTS UR QL AUTO: ABNORMAL /LPF
KETONES UR QL STRIP: NEGATIVE
LEUKOCYTE ESTERASE UR QL STRIP.AUTO: NEGATIVE
NITRITE UR QL STRIP: NEGATIVE
PH UR STRIP.AUTO: 6.5 [PH] (ref 5–8)
PROT UR QL STRIP: NEGATIVE
RBC # UR STRIP: ABNORMAL /HPF
REF LAB TEST METHOD: ABNORMAL
SP GR UR STRIP: 1.01 (ref 1–1.03)
SQUAMOUS #/AREA URNS HPF: ABNORMAL /HPF
UROBILINOGEN UR QL STRIP: ABNORMAL
WBC # UR STRIP: ABNORMAL /HPF

## 2022-08-31 NOTE — PROGRESS NOTES
Please let patient know there is no blood on his microscopic urinalysis, no other work-up is necessary at this time.

## 2022-09-13 ENCOUNTER — PROCEDURE VISIT (OUTPATIENT)
Dept: UROLOGY | Facility: CLINIC | Age: 42
End: 2022-09-13

## 2022-09-13 VITALS — BODY MASS INDEX: 31.52 KG/M2 | HEIGHT: 68 IN | WEIGHT: 208 LBS

## 2022-09-13 DIAGNOSIS — R39.9 LOWER URINARY TRACT SYMPTOMS: ICD-10-CM

## 2022-09-13 DIAGNOSIS — R31.29 MICROSCOPIC HEMATURIA: Primary | ICD-10-CM

## 2022-09-13 PROCEDURE — 52000 CYSTOURETHROSCOPY: CPT | Performed by: STUDENT IN AN ORGANIZED HEALTH CARE EDUCATION/TRAINING PROGRAM

## 2022-09-13 PROCEDURE — 81003 URINALYSIS AUTO W/O SCOPE: CPT | Performed by: STUDENT IN AN ORGANIZED HEALTH CARE EDUCATION/TRAINING PROGRAM

## 2022-09-13 NOTE — PROGRESS NOTES
Preprocedure diagnosis  LUTS    Postprocedure diagnosis  LUTS    Procedure  Flexible Cystourethroscopy    Attending surgeon  Randy Jackson MD    Anesthesia  2% lidocaine jelly intraurethrally    Complications  None    Indications  42 y.o. male undergoing a flexible cystoscopy for the above mentioned indications.  Informed consent was obtained.      Findings  The urethral urothelium was within normal limits with no visible strictures.      The prostatic urethra revealed no significant lateral lobe coaptation, with no median lobe.  Patient has a relatively high and tight bladder neck.    Bladder findings included bilateral ureters in orthotopic position, normal bladder mucosa without tumors, lesions, or stones.     Procedure  The patient was placed in supine position and prepped and draped in sterile fashion with lidocaine jelly instill per the urethra for anesthesia 5 minutes prior to procedure start.  A brief timeout was performed including available nursing staff and the patient.      The 16 Fr digital flexible cystoscope was lubricated and gently advanced into the urethral meatus.     The penile and bulbar urethra appeared widely patent without visible lesion or stricture.     The prostatic urethra was notable for no lateral lobe coaptation, with no median lobe component.  Relatively high and tight bladder neck noted.     The scope was then advanced into the bladder. The bladder was completely visualized starting with the trigone.     There were bilateral orthotopic ureteral orifices.     The posterior wall, lateral walls, anterior wall, and dome were completely visualized WITHOUT obvious mucosal lesions, tumors, stones, or trabeculations.      The cystoscope was retroflexed and the bladder neck and prostate were further visualized and appeared normal.      The cystoscope was then gently withdrawn while visualizing the urethra and the procedure was then terminated.  The patient tolerated the procedure well.       Follow Up:   Patient's urinary symptoms have gotten significantly better since he last saw me just with fluid intake and consistent timed double voiding.  His right lower quadrant abdominal pain has resolved.  He stopped taking tamsulosin secondary to gastric side effects.  At this time he would like to monitor his symptoms after discussion of options including pelvic floor physical therapy referral or urodynamics for further evaluation of obstruction which may be related to a tight bladder neck requiring TUIP.  I would like to avoid surgical intervention given his young age and the risk of sexual dysfunction associated with surgery.  We will see the patient back in 6 months and he will monitor conservatively for now.    Randy Jackson MD

## 2022-10-01 ENCOUNTER — TELEPHONE (OUTPATIENT)
Dept: INTERNAL MEDICINE | Facility: CLINIC | Age: 42
End: 2022-10-01

## 2022-10-27 ENCOUNTER — OFFICE VISIT (OUTPATIENT)
Dept: INTERNAL MEDICINE | Facility: CLINIC | Age: 42
End: 2022-10-27

## 2022-10-27 VITALS
SYSTOLIC BLOOD PRESSURE: 110 MMHG | WEIGHT: 213.38 LBS | BODY MASS INDEX: 32.68 KG/M2 | DIASTOLIC BLOOD PRESSURE: 70 MMHG | RESPIRATION RATE: 20 BRPM | HEART RATE: 70 BPM | TEMPERATURE: 98 F

## 2022-10-27 DIAGNOSIS — Z13.6 ENCOUNTER FOR LIPID SCREENING FOR CARDIOVASCULAR DISEASE: ICD-10-CM

## 2022-10-27 DIAGNOSIS — F41.9 ANXIETY AND DEPRESSION: ICD-10-CM

## 2022-10-27 DIAGNOSIS — F32.A ANXIETY AND DEPRESSION: ICD-10-CM

## 2022-10-27 DIAGNOSIS — Z13.220 ENCOUNTER FOR LIPID SCREENING FOR CARDIOVASCULAR DISEASE: ICD-10-CM

## 2022-10-27 DIAGNOSIS — Z12.5 SCREENING PSA (PROSTATE SPECIFIC ANTIGEN): ICD-10-CM

## 2022-10-27 DIAGNOSIS — M25.511 ACUTE PAIN OF RIGHT SHOULDER: Primary | ICD-10-CM

## 2022-10-27 PROCEDURE — 99213 OFFICE O/P EST LOW 20 MIN: CPT | Performed by: INTERNAL MEDICINE

## 2022-10-27 RX ORDER — TRIAMCINOLONE ACETONIDE 1 MG/G
CREAM TOPICAL
COMMUNITY
Start: 2022-10-20

## 2022-10-27 RX ORDER — FLUOCINONIDE TOPICAL SOLUTION USP, 0.05% 0.5 MG/ML
SOLUTION TOPICAL
COMMUNITY
Start: 2022-10-20

## 2022-10-27 RX ORDER — KETOCONAZOLE 20 MG/ML
SHAMPOO TOPICAL
COMMUNITY
Start: 2022-10-20

## 2022-10-28 ENCOUNTER — LAB (OUTPATIENT)
Dept: LAB | Facility: HOSPITAL | Age: 42
End: 2022-10-28

## 2022-10-28 ENCOUNTER — HOSPITAL ENCOUNTER (OUTPATIENT)
Dept: GENERAL RADIOLOGY | Facility: HOSPITAL | Age: 42
Discharge: HOME | End: 2022-10-28

## 2022-10-28 ENCOUNTER — LAB (OUTPATIENT)
Dept: INTERNAL MEDICINE | Facility: CLINIC | Age: 42
End: 2022-10-28

## 2022-10-28 DIAGNOSIS — Z13.6 ENCOUNTER FOR LIPID SCREENING FOR CARDIOVASCULAR DISEASE: ICD-10-CM

## 2022-10-28 DIAGNOSIS — F32.A ANXIETY AND DEPRESSION: ICD-10-CM

## 2022-10-28 DIAGNOSIS — M25.511 ACUTE PAIN OF RIGHT SHOULDER: ICD-10-CM

## 2022-10-28 DIAGNOSIS — F41.9 ANXIETY AND DEPRESSION: ICD-10-CM

## 2022-10-28 DIAGNOSIS — Z12.5 SCREENING PSA (PROSTATE SPECIFIC ANTIGEN): ICD-10-CM

## 2022-10-28 DIAGNOSIS — Z13.220 ENCOUNTER FOR LIPID SCREENING FOR CARDIOVASCULAR DISEASE: ICD-10-CM

## 2022-10-28 LAB
ALBUMIN SERPL-MCNC: 4.7 G/DL (ref 3.5–5.2)
ALBUMIN/GLOB SERPL: 1.8 G/DL
ALP SERPL-CCNC: 107 U/L (ref 39–117)
ALT SERPL W P-5'-P-CCNC: 72 U/L (ref 1–41)
ANION GAP SERPL CALCULATED.3IONS-SCNC: 8.2 MMOL/L (ref 5–15)
AST SERPL-CCNC: 47 U/L (ref 1–40)
BILIRUB SERPL-MCNC: 0.4 MG/DL (ref 0–1.2)
BUN SERPL-MCNC: 15 MG/DL (ref 6–20)
BUN/CREAT SERPL: 11.7 (ref 7–25)
CALCIUM SPEC-SCNC: 9.7 MG/DL (ref 8.6–10.5)
CHLORIDE SERPL-SCNC: 102 MMOL/L (ref 98–107)
CHOLEST SERPL-MCNC: 180 MG/DL (ref 0–200)
CO2 SERPL-SCNC: 30.8 MMOL/L (ref 22–29)
CREAT SERPL-MCNC: 1.28 MG/DL (ref 0.76–1.27)
DEPRECATED RDW RBC AUTO: 41.6 FL (ref 37–54)
EGFRCR SERPLBLD CKD-EPI 2021: 71.2 ML/MIN/1.73
ERYTHROCYTE [DISTWIDTH] IN BLOOD BY AUTOMATED COUNT: 13.1 % (ref 12.3–15.4)
GLOBULIN UR ELPH-MCNC: 2.6 GM/DL
GLUCOSE SERPL-MCNC: 86 MG/DL (ref 65–99)
HCT VFR BLD AUTO: 40.7 % (ref 37.5–51)
HDLC SERPL-MCNC: 40 MG/DL (ref 40–60)
HGB BLD-MCNC: 13.9 G/DL (ref 13–17.7)
LDLC SERPL CALC-MCNC: 106 MG/DL (ref 0–100)
LDLC/HDLC SERPL: 2.52 {RATIO}
MCH RBC QN AUTO: 29.3 PG (ref 26.6–33)
MCHC RBC AUTO-ENTMCNC: 34.2 G/DL (ref 31.5–35.7)
MCV RBC AUTO: 85.7 FL (ref 79–97)
PLATELET # BLD AUTO: 240 10*3/MM3 (ref 140–450)
PMV BLD AUTO: 11.3 FL (ref 6–12)
POTASSIUM SERPL-SCNC: 4.4 MMOL/L (ref 3.5–5.2)
PROT SERPL-MCNC: 7.3 G/DL (ref 6–8.5)
PSA SERPL-MCNC: 0.21 NG/ML (ref 0–4)
RBC # BLD AUTO: 4.75 10*6/MM3 (ref 4.14–5.8)
SODIUM SERPL-SCNC: 141 MMOL/L (ref 136–145)
T4 FREE SERPL-MCNC: 1.32 NG/DL (ref 0.93–1.7)
TRIGL SERPL-MCNC: 196 MG/DL (ref 0–150)
TSH SERPL DL<=0.05 MIU/L-ACNC: 2.08 UIU/ML (ref 0.27–4.2)
VLDLC SERPL-MCNC: 34 MG/DL (ref 5–40)
WBC NRBC COR # BLD: 7.82 10*3/MM3 (ref 3.4–10.8)

## 2022-10-28 PROCEDURE — 80061 LIPID PANEL: CPT | Performed by: INTERNAL MEDICINE

## 2022-10-28 PROCEDURE — G0103 PSA SCREENING: HCPCS | Performed by: INTERNAL MEDICINE

## 2022-10-28 PROCEDURE — 73030 X-RAY EXAM OF SHOULDER: CPT

## 2022-10-28 PROCEDURE — 80053 COMPREHEN METABOLIC PANEL: CPT | Performed by: INTERNAL MEDICINE

## 2022-10-28 PROCEDURE — 84439 ASSAY OF FREE THYROXINE: CPT | Performed by: INTERNAL MEDICINE

## 2022-10-28 PROCEDURE — 36415 COLL VENOUS BLD VENIPUNCTURE: CPT

## 2022-10-28 PROCEDURE — 84443 ASSAY THYROID STIM HORMONE: CPT | Performed by: INTERNAL MEDICINE

## 2022-10-28 PROCEDURE — 85027 COMPLETE CBC AUTOMATED: CPT | Performed by: INTERNAL MEDICINE

## 2022-11-01 ENCOUNTER — TELEPHONE (OUTPATIENT)
Dept: INTERNAL MEDICINE | Facility: CLINIC | Age: 42
End: 2022-11-01

## 2022-11-01 NOTE — TELEPHONE ENCOUNTER
Patient would like results of his lab tests and also is requesting a copy of them   Call patient at [phone]

## 2022-11-05 NOTE — TELEPHONE ENCOUNTER
Please tell patient that his labs show the following:  CBC is normal no evidence of anemia    Kidney function is normal he did have a slight elevation in creatinine 1.28 (normal parameters is 0.76- 1.27) but his kidney function was entirely normal this may be reflection of muscle mass    Cholesterol is normal with exception of mild elevation in triglycerides at 196 (normal less than 150)    Slight elevation in his liver enzymes.  I would like to get a liver ultrasound just to evaluate for any possible fatty liver    Thyroid function is normal    PSA/prostate level is normal    A letter with labs on it will be sent to his house    Let me know if he has any other questions

## 2022-11-08 NOTE — TELEPHONE ENCOUNTER
Spoke with patient and reviewed his lab results and advised that he should receive a copy of his results this week.  Patient is going to watch his diet and discuss the ultrasound at his physical which is scheduled on 1-31-23.

## 2022-12-28 ENCOUNTER — TELEPHONE (OUTPATIENT)
Dept: INTERNAL MEDICINE | Facility: CLINIC | Age: 42
End: 2022-12-28

## 2022-12-28 NOTE — TELEPHONE ENCOUNTER
Caller: Abhishek Brand    Relationship: Self    Best call back number: [phone]    Caller requesting test results: yes    What test was performed: LAB WORK    When was the test performed: 10/28/22    Where was the test performed: IN OFFICE    Additional notes: PATIENT WOULD LIKE HIS TEST RESULTS MAILED TO HIS HOME:    [address] [place] [address] [address] [place] [address]    Additional Information:    PATIENT WAS CALLING TO INFORM DR RODRÍGUEZ HIS SHOULDER HAS IMPROVED AND NO LONGER NEEDS THE REFERRAL

## 2022-12-28 NOTE — TELEPHONE ENCOUNTER
FYI:  PATIENT WAS CALLING TO INFORM DR RODRÍGUEZ HIS SHOULDER HAS IMPROVED AND NO LONGER NEEDS THE REFERRAL       Labs mailed to patients address.

## 2023-04-21 ENCOUNTER — OFFICE VISIT (OUTPATIENT)
Dept: INTERNAL MEDICINE | Facility: CLINIC | Age: 43
End: 2023-04-21
Payer: OTHER GOVERNMENT

## 2023-04-21 VITALS
DIASTOLIC BLOOD PRESSURE: 72 MMHG | HEIGHT: 68 IN | BODY MASS INDEX: 31.83 KG/M2 | SYSTOLIC BLOOD PRESSURE: 122 MMHG | WEIGHT: 210 LBS | HEART RATE: 68 BPM | RESPIRATION RATE: 12 BRPM | TEMPERATURE: 97.9 F

## 2023-04-21 DIAGNOSIS — E66.9 OBESITY WITH BODY MASS INDEX OF 30.0-39.9: Primary | ICD-10-CM

## 2023-04-21 DIAGNOSIS — Z00.00 WELL ADULT EXAM: ICD-10-CM

## 2023-04-21 DIAGNOSIS — S76.211A INGUINAL STRAIN, RIGHT, INITIAL ENCOUNTER: ICD-10-CM

## 2023-04-21 DIAGNOSIS — Z13.220 LIPID SCREENING: ICD-10-CM

## 2023-04-21 LAB
CHOLEST SERPL-MCNC: 180 MG/DL (ref 0–200)
HDLC SERPL-MCNC: 49 MG/DL (ref 40–60)
LDLC SERPL CALC-MCNC: 113 MG/DL (ref 0–100)
LDLC/HDLC SERPL: 2.27 {RATIO}
TRIGL SERPL-MCNC: 100 MG/DL (ref 0–150)
VLDLC SERPL-MCNC: 18 MG/DL (ref 5–40)

## 2023-04-21 PROCEDURE — 80061 LIPID PANEL: CPT | Performed by: INTERNAL MEDICINE

## 2023-04-21 RX ORDER — CLOBETASOL PROPIONATE 0.5 MG/G
OINTMENT TOPICAL
COMMUNITY
Start: 2023-04-18

## 2023-04-21 NOTE — PROGRESS NOTES
"Chief Complaint  Annual Exam    Subjective    Abhishek Brand is a 43 y.o. male.     Abhishek Brand presents to [facility] for annual physical exam.      History of Present Illness     1. Right inguinal strain - The patient lifts heavy things, he will have a strain on his right side. It has been there for a while, but it has gotten worse. He initially thought it was a pull, but when he lifts things later, he gets weak in that area.    2. Hyperlipidemia - The patient would like to make sure his cholesterol is okay.    3. Health maintenance - The patient does not need refills on his medications.    The following portions of the patient's history were reviewed and updated as appropriate: allergies, current medications, past family history, past medical history, past social history, past surgical history and problem list.        Complete Adult Physical          Diet: Regular        Exercise: Minimal to none        Social History no EtOH consumption, no IV drug use, no marijuana        Preventative Screenings: Up-to-date        Immunizations: Up-to-date          Review of Systems   A review of systems was performed, and the pertinent positives are noted in the HPI.    Objective   Vital Signs:   /72 (BP Location: Right arm, Patient Position: Sitting, Cuff Size: Adult)   Pulse 68   Temp 97.9 °F (36.6 °C) (Infrared)   Resp 12   Ht 172.1 cm (67.75\")   Wt 95.3 kg (210 lb)   BMI 32.17 kg/m²     Body mass index is 32.17 kg/m².  BMI is >= 30 and <35. (Class 1 Obesity). The following options were offered after discussion;: weight loss educational material (shared in after visit summary) and exercise counseling/recommendations     Physical Exam  HENT:      Head: Normocephalic and atraumatic.      Mouth/Throat:      Mouth: Mucous membranes are moist.   Eyes:      Pupils: Pupils are equal, round, and reactive to light.   Neck:      Comments: No cervical " lymphadenopathy.  Cardiovascular:      Heart sounds: S1 normal and S2 normal. No murmur heard.    No friction rub. No gallop.      Comments: Peripheral vascular: +2 pulses, warm extremity, good perfusion.    Pulmonary:      Breath sounds: Normal breath sounds. No wheezing or rhonchi.   Abdominal:      General: Bowel sounds are normal.      Palpations: Abdomen is soft. There is no mass.      Tenderness: There is no abdominal tenderness.   Genitourinary:     Comments: Both testicles are descended. No testicular mass, no inguinal hernia, although per report in the right lower quadrant area suprapubic is area of concern where patient has reported recurrent strain upon lifting heavy objects.  Musculoskeletal:      Cervical back: Neck supple.      Comments: +5/5 both upper and lower proximal distributions and symmetric.   Neurological:      Comments: Cranial nerves intact, +2 DTRs.               Assessment and Plan  Diagnoses and all orders for this visit:    1. Right inguinal strain.  - Initial assessment. Discussed with patient about monitoring heavy lifting and activity as tolerated to avoid any further injury or future hernia. Patient understood and questions were answered.    2. Other anticipatory guidance.  - Recommended routine ophthalmology visit. Continue to follow up with routine dental visit.    3. Laboratory tests.  - We will do CBC, CMP, TSH, free T4, lipid profile and then we will also do a PSA for prostate screening today.            Follow Up   No follow-ups on file.  Patient was given instructions and counseling regarding his condition or for health maintenance advice. Please see specific information pulled into the AVS if appropriate.       Transcribed from ambient dictation for Kush Reddy MD by Mere Keating.  04/21/23   12:49 EDT    Patient or patient representative verbalized consent to the visit recording.  I have personally performed the services described in this document as transcribed by the  above individual, and it is both accurate and complete.

## 2023-10-25 ENCOUNTER — TELEPHONE (OUTPATIENT)
Dept: INTERNAL MEDICINE | Facility: CLINIC | Age: 43
End: 2023-10-25
Payer: OTHER GOVERNMENT

## 2023-10-25 NOTE — TELEPHONE ENCOUNTER
Caller: Abhishek Brand    Relationship: Self    Best call back number: [phone]    What orders are you requesting (i.e. lab or imaging): LABS    In what timeframe would the patient need to come in:    BEFORE DECEMBER 13 APPOINTMENT    Additional notes:     PLEASE CALL PATIENT WHEN ORDERS HAVE BEEN PUT IN EPIC SO THAT HE WILL KNOW WHEN TO COME IN TO HAVE BLOOD DRAWN

## 2023-10-26 DIAGNOSIS — Z13.220 LIPID SCREENING: ICD-10-CM

## 2023-10-26 DIAGNOSIS — F41.9 ANXIETY AND DEPRESSION: Primary | ICD-10-CM

## 2023-10-26 DIAGNOSIS — F32.A ANXIETY AND DEPRESSION: Primary | ICD-10-CM

## 2023-10-26 DIAGNOSIS — Z12.5 SCREENING PSA (PROSTATE SPECIFIC ANTIGEN): ICD-10-CM

## 2023-10-26 NOTE — TELEPHONE ENCOUNTER
Please let patient know that lab orders have been placed    He can come in as a walk-in at any time

## 2023-11-20 ENCOUNTER — LAB (OUTPATIENT)
Dept: INTERNAL MEDICINE | Facility: CLINIC | Age: 43
End: 2023-11-20
Payer: OTHER GOVERNMENT

## 2023-11-20 DIAGNOSIS — F32.A ANXIETY AND DEPRESSION: ICD-10-CM

## 2023-11-20 DIAGNOSIS — Z12.5 SCREENING PSA (PROSTATE SPECIFIC ANTIGEN): ICD-10-CM

## 2023-11-20 DIAGNOSIS — Z13.220 LIPID SCREENING: ICD-10-CM

## 2023-11-20 DIAGNOSIS — F41.9 ANXIETY AND DEPRESSION: ICD-10-CM

## 2023-11-20 LAB
ALBUMIN SERPL-MCNC: 4.3 G/DL (ref 3.5–5.2)
ALBUMIN/GLOB SERPL: 1.8 G/DL
ALP SERPL-CCNC: 107 U/L (ref 39–117)
ALT SERPL W P-5'-P-CCNC: 49 U/L (ref 1–41)
ANION GAP SERPL CALCULATED.3IONS-SCNC: 9.7 MMOL/L (ref 5–15)
AST SERPL-CCNC: 39 U/L (ref 1–40)
BILIRUB SERPL-MCNC: 0.2 MG/DL (ref 0–1.2)
BUN SERPL-MCNC: 8 MG/DL (ref 6–20)
BUN/CREAT SERPL: 6.4 (ref 7–25)
CALCIUM SPEC-SCNC: 9.6 MG/DL (ref 8.6–10.5)
CHLORIDE SERPL-SCNC: 105 MMOL/L (ref 98–107)
CHOLEST SERPL-MCNC: 187 MG/DL (ref 0–200)
CO2 SERPL-SCNC: 26.3 MMOL/L (ref 22–29)
CREAT SERPL-MCNC: 1.25 MG/DL (ref 0.76–1.27)
DEPRECATED RDW RBC AUTO: 41.2 FL (ref 37–54)
EGFRCR SERPLBLD CKD-EPI 2021: 72.8 ML/MIN/1.73
ERYTHROCYTE [DISTWIDTH] IN BLOOD BY AUTOMATED COUNT: 13.6 % (ref 12.3–15.4)
GLOBULIN UR ELPH-MCNC: 2.4 GM/DL
GLUCOSE SERPL-MCNC: 89 MG/DL (ref 65–99)
HCT VFR BLD AUTO: 39.3 % (ref 37.5–51)
HDLC SERPL-MCNC: 38 MG/DL (ref 40–60)
HGB BLD-MCNC: 13.7 G/DL (ref 13–17.7)
LDLC SERPL CALC-MCNC: 115 MG/DL (ref 0–100)
LDLC/HDLC SERPL: 2.89 {RATIO}
MCH RBC QN AUTO: 29.8 PG (ref 26.6–33)
MCHC RBC AUTO-ENTMCNC: 34.9 G/DL (ref 31.5–35.7)
MCV RBC AUTO: 85.6 FL (ref 79–97)
PLATELET # BLD AUTO: 236 10*3/MM3 (ref 140–450)
PMV BLD AUTO: 10.8 FL (ref 6–12)
POTASSIUM SERPL-SCNC: 4.2 MMOL/L (ref 3.5–5.2)
PROT SERPL-MCNC: 6.7 G/DL (ref 6–8.5)
PSA SERPL-MCNC: 0.14 NG/ML (ref 0–4)
RBC # BLD AUTO: 4.59 10*6/MM3 (ref 4.14–5.8)
SODIUM SERPL-SCNC: 141 MMOL/L (ref 136–145)
T4 FREE SERPL-MCNC: 1.28 NG/DL (ref 0.93–1.7)
TRIGL SERPL-MCNC: 196 MG/DL (ref 0–150)
TSH SERPL DL<=0.05 MIU/L-ACNC: 1.66 UIU/ML (ref 0.27–4.2)
VLDLC SERPL-MCNC: 34 MG/DL (ref 5–40)
WBC NRBC COR # BLD AUTO: 7.77 10*3/MM3 (ref 3.4–10.8)

## 2023-11-20 PROCEDURE — 84443 ASSAY THYROID STIM HORMONE: CPT | Performed by: INTERNAL MEDICINE

## 2023-11-20 PROCEDURE — G0103 PSA SCREENING: HCPCS | Performed by: INTERNAL MEDICINE

## 2023-11-20 PROCEDURE — 80061 LIPID PANEL: CPT | Performed by: INTERNAL MEDICINE

## 2023-11-20 PROCEDURE — 36415 COLL VENOUS BLD VENIPUNCTURE: CPT | Performed by: INTERNAL MEDICINE

## 2023-11-20 PROCEDURE — 84439 ASSAY OF FREE THYROXINE: CPT | Performed by: INTERNAL MEDICINE

## 2023-11-20 PROCEDURE — 80053 COMPREHEN METABOLIC PANEL: CPT | Performed by: INTERNAL MEDICINE

## 2023-11-20 PROCEDURE — 85027 COMPLETE CBC AUTOMATED: CPT | Performed by: INTERNAL MEDICINE

## 2023-12-13 ENCOUNTER — OFFICE VISIT (OUTPATIENT)
Dept: INTERNAL MEDICINE | Facility: CLINIC | Age: 43
End: 2023-12-13
Payer: OTHER GOVERNMENT

## 2023-12-13 VITALS
TEMPERATURE: 97.3 F | SYSTOLIC BLOOD PRESSURE: 112 MMHG | HEART RATE: 76 BPM | BODY MASS INDEX: 33.58 KG/M2 | WEIGHT: 219.25 LBS | RESPIRATION RATE: 16 BRPM | DIASTOLIC BLOOD PRESSURE: 64 MMHG

## 2023-12-13 DIAGNOSIS — S39.011A STRAIN OF ABDOMINAL WALL, INITIAL ENCOUNTER: ICD-10-CM

## 2023-12-13 DIAGNOSIS — R53.83 OTHER FATIGUE: ICD-10-CM

## 2023-12-13 DIAGNOSIS — R68.82 LOW LIBIDO: Primary | ICD-10-CM

## 2023-12-13 DIAGNOSIS — G47.33 OSA (OBSTRUCTIVE SLEEP APNEA): ICD-10-CM

## 2023-12-13 DIAGNOSIS — E29.1 HYPOGONADISM IN MALE: ICD-10-CM

## 2023-12-13 NOTE — PROGRESS NOTES
"Chief Complaint  Med Refill (Follow up) and Hiatal Hernia    Subjective    Abhishek Brand is a 43 y.o. male.     Abhishek Brand presents to [facility] for       History of Present Illness    The following portions of the patient's history were reviewed and updated as appropriate: allergies, current medications, past family history, past medical history, past social history, past surgical history, and problem list.    Abdominal strain  Duration 2-3 weeks  Sx Patient says that he was lifting a microwave  and positioning he says that he felt a \"pull \" like sensation mid epigastric region.  Patient says that he noticed the sensations shortly after he repositions the microwave and is concerned about a hernia in this particular region.  No nausea, no vomiting, no other system symptoms.      2 low energy, low libido  Duration over the past 1 year but getting worse.  Patient says that he also has been having decreased energy during this time and fatigue.  Patient says that sometimes these symptoms tend to be worse during the winter months and his does occur occasionally.  Patient denies any suicidal ideations, emotional liability threats towards him self or anybody else.    3 obstructive sleep apnea new issue, patient says that his wife stated that he snores heavily when asked if he completely feels well rested patient says 50% of the time he does not but occasionally mostly increased fatigue throughout the day    Review of Systems   All other systems reviewed and are negative.      Objective   Vital Signs:   /64 (BP Location: Right arm, Patient Position: Sitting, Cuff Size: Adult)   Pulse 76   Temp 97.3 °F (36.3 °C) (Temporal)   Resp 16   Wt 99.5 kg (219 lb 4 oz)   BMI 33.58 kg/m²     Body mass index is 33.58 kg/m².        Physical Exam  Vitals and nursing note reviewed.   Constitutional:       Appearance: He is normal weight.   HENT:      Head: Normocephalic and " atraumatic.      Nose: Nose normal.      Mouth/Throat:      Mouth: Mucous membranes are moist.   Eyes:      Extraocular Movements: Extraocular movements intact.      Pupils: Pupils are equal, round, and reactive to light.   Cardiovascular:      Rate and Rhythm: Normal rate and regular rhythm.      Pulses: Normal pulses.      Heart sounds: Normal heart sounds.   Pulmonary:      Effort: Pulmonary effort is normal.      Breath sounds: Normal breath sounds.   Abdominal:      General: Bowel sounds are normal.      Palpations: Abdomen is soft.   Musculoskeletal:         General: Normal range of motion.      Cervical back: Normal range of motion and neck supple.   Skin:     General: Skin is warm.      Capillary Refill: Capillary refill takes less than 2 seconds.   Neurological:      Mental Status: He is alert.   Psychiatric:         Mood and Affect: Mood normal.         Behavior: Behavior normal.         Thought Content: Thought content normal.         Judgment: Judgment normal.               Assessment and Plan  Diagnoses and all orders for this visit:    Spent approximately 30 minutes face-to-face with patient    Diagnoses and all orders for this visit:    1. Low libido (Primary)  -     Testosterone, Free, Total; Future    2. Other fatigue  -     Testosterone, Free, Total; Future    3. Strain of abdominal wall, initial encounter    4. IVY (obstructive sleep apnea)  -     Ambulatory Referral to Sleep Medicine    5. Hypogonadism in male  -     Testosterone, Free, Total; Future              Follow Up   No follow-ups on file.  Patient was given instructions and counseling regarding his condition or for health maintenance advice. Please see specific information pulled into the AVS if appropriate.

## 2024-03-20 ENCOUNTER — OFFICE VISIT (OUTPATIENT)
Dept: INTERNAL MEDICINE | Facility: CLINIC | Age: 44
End: 2024-03-20
Payer: OTHER GOVERNMENT

## 2024-03-20 VITALS
BODY MASS INDEX: 32.01 KG/M2 | HEART RATE: 68 BPM | TEMPERATURE: 97.3 F | WEIGHT: 209 LBS | DIASTOLIC BLOOD PRESSURE: 80 MMHG | RESPIRATION RATE: 16 BRPM | SYSTOLIC BLOOD PRESSURE: 122 MMHG

## 2024-03-20 DIAGNOSIS — E29.1 HYPOGONADISM IN MALE: ICD-10-CM

## 2024-03-20 DIAGNOSIS — F41.9 ANXIETY AND DEPRESSION: ICD-10-CM

## 2024-03-20 DIAGNOSIS — R03.0 ELEVATED BLOOD PRESSURE READING: ICD-10-CM

## 2024-03-20 DIAGNOSIS — R68.82 LOW LIBIDO: ICD-10-CM

## 2024-03-20 DIAGNOSIS — R53.83 OTHER FATIGUE: ICD-10-CM

## 2024-03-20 DIAGNOSIS — Z12.11 SCREENING FOR COLON CANCER: Primary | ICD-10-CM

## 2024-03-20 DIAGNOSIS — F32.A ANXIETY AND DEPRESSION: ICD-10-CM

## 2024-03-20 PROCEDURE — 84402 ASSAY OF FREE TESTOSTERONE: CPT | Performed by: INTERNAL MEDICINE

## 2024-03-20 PROCEDURE — 84403 ASSAY OF TOTAL TESTOSTERONE: CPT | Performed by: INTERNAL MEDICINE

## 2024-03-20 RX ORDER — PROPRANOLOL HYDROCHLORIDE 10 MG/1
10 TABLET ORAL 2 TIMES DAILY PRN
COMMUNITY
Start: 2024-01-16

## 2024-03-20 NOTE — PROGRESS NOTES
Chief Complaint  Hypertension    Subjective    Abhishek Brand is a 44 y.o. male.     Abhishek Brand presents to [facility] for hypertension follow-up.      History of Present Illness    1. Hypertension. - He was feeling poorly and could not figure out what it was. His wife has slight blood pressure issues, so she told him to check his blood pressure. When he checked it, his blood pressure was 140/90 mmHg. The next time he checked it, it was 150/90 mmHg. He went to [facility] and they checked his blood pressure and it was 149/92 mmHg. They told him that he needed to see his primary care doctor. He is struggling with his kids and life in general. He does not do well during the winter months. He has not been exercising as much as he should.    2. Testosterone concerns. - He would like to get his testosterone checked.    The following portions of the patient's history were reviewed and updated as appropriate: allergies, current medications, past family history, past medical history, past social history, past surgical history, and problem list.        Review of Systems    Objective   Vital Signs:   /80 (BP Location: Right arm, Patient Position: Sitting, Cuff Size: Adult)   Pulse 68   Temp 97.3 °F (36.3 °C) (Temporal)   Resp 16   Wt 94.8 kg (209 lb)   BMI 32.01 kg/m²     Body mass index is 32.01 kg/m².        Physical Exam  HENT:      Head: Normocephalic and atraumatic.      Mouth/Throat:      Mouth: Mucous membranes are moist.   Eyes:      Extraocular Movements: Extraocular movements intact.      Pupils: Pupils are equal, round, and reactive to light.   Neck:      Comments: No goiter.  Cardiovascular:      Rate and Rhythm: Normal rate and regular rhythm.      Heart sounds: S1 normal and S2 normal. No murmur heard.     No friction rub. No gallop.   Pulmonary:      Effort: Pulmonary effort is normal.      Breath sounds: Normal breath sounds. No wheezing or rhonchi.    Musculoskeletal:      Cervical back: Neck supple.   Lymphadenopathy:      Cervical: No cervical adenopathy.   Neurological:      Mental Status: He is alert and oriented to person, place, and time.             Assessment and Plan  Diagnoses and all orders for this visit:    1. Slightly elevated blood pressure.  - His blood pressure readings are normal here today in clinic visit, but in review, the last few weeks, the patient has been under some mild stress, which he thinks has contributed to his blood pressure.   - I have encouraged him to continue to monitor his blood pressure as needed and also monitor for any worsening symptoms.   - If that should occur, the patient should make a follow-up appointment to be seen in clinic.    I will see the patient at his next scheduled follow-up.    Diagnoses and all orders for this visit:    1. Screening for colon cancer (Primary)  -     Ambulatory Referral For Screening Colonoscopy    2. Anxiety and depression    3. Elevated blood pressure reading-continue to monitor blood pressures periodically    4. Low libido  -     Testosterone, Free, Total; Future  -     Testosterone, Free, Total    5. Other fatigue  -     Testosterone, Free, Total; Future  -     Testosterone, Free, Total    6. Hypogonadism in male  -     Testosterone, Free, Total; Future  -     Testosterone, Free, Total          Follow Up   No follow-ups on file.  Patient was given instructions and counseling regarding his condition or for health maintenance advice. Please see specific information pulled into the AVS if appropriate.        Transcribed from ambient dictation for Kush Reddy MD by Alisa Alcala.  03/20/24   12:48 EDT    Patient or patient representative verbalized consent to the visit recording.  I have personally performed the services described in this document as transcribed by the above individual, and it is both accurate and complete.

## 2024-03-23 LAB
TESTOST FREE SERPL-MCNC: 2 PG/ML (ref 6.8–21.5)
TESTOST SERPL-MCNC: 139 NG/DL (ref 264–916)

## 2024-03-29 ENCOUNTER — TELEPHONE (OUTPATIENT)
Dept: INTERNAL MEDICINE | Facility: CLINIC | Age: 44
End: 2024-03-29
Payer: OTHER GOVERNMENT

## 2024-03-29 DIAGNOSIS — E29.1 HYPOGONADISM IN MALE: Primary | ICD-10-CM

## 2024-03-29 NOTE — TELEPHONE ENCOUNTER
Called patient, unable to reach, and left vm.    RELAY:      Please tell patient that his testosterone level is very low.     We need to repeat another testosterone level (fasting) for insurance purposes     Orders are on the chart

## 2024-03-29 NOTE — TELEPHONE ENCOUNTER
Please tell patient that his testosterone level is very low.    We need to repeat another testosterone level (fasting) for insurance purposes    Orders are on the chart

## 2024-04-01 NOTE — TELEPHONE ENCOUNTER
Called patient and read providers message, good verb given. He stated that he will come in some time this week or next to have this lab completed, fasting.

## 2024-04-09 ENCOUNTER — LAB (OUTPATIENT)
Dept: INTERNAL MEDICINE | Facility: CLINIC | Age: 44
End: 2024-04-09
Payer: OTHER GOVERNMENT

## 2024-04-09 DIAGNOSIS — E29.1 HYPOGONADISM IN MALE: ICD-10-CM

## 2024-04-09 LAB
FSH SERPL-ACNC: 3.33 MIU/ML
LH SERPL-ACNC: 2.14 MIU/ML
PROLACTIN SERPL-MCNC: 8.61 NG/ML (ref 4.04–15.2)

## 2024-04-09 PROCEDURE — 83002 ASSAY OF GONADOTROPIN (LH): CPT | Performed by: INTERNAL MEDICINE

## 2024-04-09 PROCEDURE — 36415 COLL VENOUS BLD VENIPUNCTURE: CPT | Performed by: INTERNAL MEDICINE

## 2024-04-09 PROCEDURE — 84146 ASSAY OF PROLACTIN: CPT | Performed by: INTERNAL MEDICINE

## 2024-04-09 PROCEDURE — 84403 ASSAY OF TOTAL TESTOSTERONE: CPT | Performed by: INTERNAL MEDICINE

## 2024-04-09 PROCEDURE — 83001 ASSAY OF GONADOTROPIN (FSH): CPT | Performed by: INTERNAL MEDICINE

## 2024-04-09 PROCEDURE — 84402 ASSAY OF FREE TESTOSTERONE: CPT | Performed by: INTERNAL MEDICINE

## 2024-04-14 LAB
TESTOST FREE SERPL-MCNC: 1.5 PG/ML (ref 6.8–21.5)
TESTOST SERPL-MCNC: 110 NG/DL (ref 264–916)

## 2024-04-18 ENCOUNTER — TELEPHONE (OUTPATIENT)
Dept: INTERNAL MEDICINE | Facility: CLINIC | Age: 44
End: 2024-04-18
Payer: OTHER GOVERNMENT

## 2024-04-18 NOTE — TELEPHONE ENCOUNTER
Please tell patient that his repeat testosterone level is also low and therefore I do want to discuss treatment and management.    We can do this as a follow-up in person or via telemedicine.

## 2024-04-25 ENCOUNTER — OFFICE VISIT (OUTPATIENT)
Dept: INTERNAL MEDICINE | Facility: CLINIC | Age: 44
End: 2024-04-25
Payer: OTHER GOVERNMENT

## 2024-04-25 ENCOUNTER — TELEPHONE (OUTPATIENT)
Dept: INTERNAL MEDICINE | Facility: CLINIC | Age: 44
End: 2024-04-25

## 2024-04-25 VITALS
RESPIRATION RATE: 18 BRPM | DIASTOLIC BLOOD PRESSURE: 90 MMHG | WEIGHT: 205 LBS | SYSTOLIC BLOOD PRESSURE: 144 MMHG | TEMPERATURE: 97.7 F | HEART RATE: 84 BPM | BODY MASS INDEX: 31.4 KG/M2

## 2024-04-25 DIAGNOSIS — E29.1 HYPOGONADISM IN MALE: Primary | ICD-10-CM

## 2024-04-25 PROCEDURE — 99214 OFFICE O/P EST MOD 30 MIN: CPT | Performed by: INTERNAL MEDICINE

## 2024-04-25 RX ORDER — TESTOSTERONE 25 MG/2.5G
25 GEL TRANSDERMAL DAILY
Qty: 2.5 G | Refills: 1 | Status: SHIPPED | OUTPATIENT
Start: 2024-04-25

## 2024-04-25 NOTE — PROGRESS NOTES
Chief Complaint  Med Refill    Subjective    Abhishek Brand is a 44 y.o. male.     Abhishek Brand presents to [facility] for evaluation of hypogonadism.      History of Present Illness    1. Hypogonadism. - The patient has had 2 low testosterone levels. He expresses a preference for transdermal testosterone over injections.     2. Health maintenance. - He has a colonoscopy scheduled in 06/2024 or 07/2024.    The following portions of the patient's history were reviewed and updated as appropriate: allergies, current medications, past family history, past medical history, past social history, past surgical history, and problem list.          Review of Systems    Objective   Vital Signs:   /90 (BP Location: Right arm, Patient Position: Sitting, Cuff Size: Adult)   Pulse 84   Temp 97.7 °F (36.5 °C) (Temporal)   Resp 18   Wt 93 kg (205 lb)   BMI 31.40 kg/m²     Body mass index is 31.4 kg/m².  BMI is >= 30 and <35. (Class 1 Obesity). The following options were offered after discussion;: none (medical contraindication)     Physical Exam  HENT:      Head: Normocephalic and atraumatic.      Mouth/Throat:      Mouth: Mucous membranes are moist.   Eyes:      Extraocular Movements: Extraocular movements intact.      Pupils: Pupils are equal, round, and reactive to light.   Neck:      Comments: No goiter.  Cardiovascular:      Rate and Rhythm: Normal rate and regular rhythm.      Heart sounds: S1 normal and S2 normal. No murmur heard.     No friction rub. No gallop.   Pulmonary:      Effort: Pulmonary effort is normal.      Breath sounds: Normal breath sounds. No wheezing or rhonchi.   Musculoskeletal:      Cervical back: Neck supple.   Lymphadenopathy:      Cervical: No cervical adenopathy.   Neurological:      Mental Status: He is alert and oriented to person, place, and time.             Assessment and Plan  Diagnoses and all orders for this visit:    1.  Hypogonadism.  - A comprehensive discussion was held with the patient regarding the treatment options for hypogonadism and testosterone replacement.   - Following a thorough review of the patient's medical history, physical examination, and various treatment modalities, the decision was made to administer topical transdermal testosterone.   - The patient will be initiated on testosterone AndroGel 25 mg transdermal daily, to be applied as directed.   - Potential side effects and precautions were thoroughly discussed with the patient.   - A follow-up appointment is scheduled for 4 weeks from now to assess the patient's progress, or sooner if any concerns or side effects arise.   - During that visit, a repeat of his testosterone test will be conducted fasting.        Follow Up   No follow-ups on file.  Patient was given instructions and counseling regarding his condition or for health maintenance advice. Please see specific information pulled into the AVS if appropriate.        Transcribed from ambient dictation for Kush Reddy MD by Namita Aviles.  04/25/24   09:49 EDT    Patient or patient representative verbalized consent to the visit recording.  I have personally performed the services described in this document as transcribed by the above individual, and it is both accurate and complete.

## 2024-04-29 ENCOUNTER — TELEPHONE (OUTPATIENT)
Dept: INTERNAL MEDICINE | Facility: CLINIC | Age: 44
End: 2024-04-29
Payer: OTHER GOVERNMENT

## 2024-04-29 NOTE — TELEPHONE ENCOUNTER
Patient has been notified of providers message and is agreeable to the testosterone injector pens.

## 2024-04-29 NOTE — TELEPHONE ENCOUNTER
Caller: Abhishek Brand    Relationship: Self    Best call back number: [phone]     What is the best time to reach you: ANYTIME    Who are you requesting to speak with (clinical staff, provider,  specific staff member): DR RODRÍGUEZ     What was the call regarding: THE PATIENT IS HAVING ISSUES GETTING INSURANCE TO COVER HIS STEROID.

## 2024-05-02 DIAGNOSIS — E29.1 HYPOGONADISM IN MALE: Primary | ICD-10-CM

## 2024-05-02 RX ORDER — TESTOSTERONE ENANTHATE 75 MG/.5ML
75 INJECTION SUBCUTANEOUS WEEKLY
Qty: 1.96 ML | Refills: 2 | Status: SHIPPED | OUTPATIENT
Start: 2024-05-02

## 2024-05-02 NOTE — TELEPHONE ENCOUNTER
PATIENT IS CALLING TO FOLLOW UP ON THIS REQUEST. HE'S SPOKEN TO THE PHARMACY AND THEY DON'T SHOW THE PRESCRIPTION FOR THE PENS.

## 2024-05-06 ENCOUNTER — TELEPHONE (OUTPATIENT)
Dept: INTERNAL MEDICINE | Facility: CLINIC | Age: 44
End: 2024-05-06
Payer: OTHER GOVERNMENT

## 2024-05-20 ENCOUNTER — TELEPHONE (OUTPATIENT)
Dept: INTERNAL MEDICINE | Facility: CLINIC | Age: 44
End: 2024-05-20
Payer: OTHER GOVERNMENT

## 2024-05-20 DIAGNOSIS — L98.9 SKIN LESION: Primary | ICD-10-CM

## 2024-05-20 NOTE — TELEPHONE ENCOUNTER
Caller: Abhishek Brand    Relationship: Self    Best call back number: [phone]       What is the provider, practice or medical service name: [facility]        What is the office phone number: [phone]    Any additional details:

## 2024-05-21 NOTE — TELEPHONE ENCOUNTER
Spoke with patient and he is requesting a referral to [facility] for a spot that looks suspicious.

## 2024-05-22 NOTE — TELEPHONE ENCOUNTER
Called patient and informed that [facility] no longer accepts [employer] insurance and that we have sent referral to Dr. Jenaro Garcia who is in  network, patient has no other questions or concerns.

## 2024-05-28 ENCOUNTER — TELEPHONE (OUTPATIENT)
Dept: INTERNAL MEDICINE | Facility: CLINIC | Age: 44
End: 2024-05-28
Payer: OTHER GOVERNMENT

## 2024-06-12 ENCOUNTER — OFFICE VISIT (OUTPATIENT)
Dept: INTERNAL MEDICINE | Facility: CLINIC | Age: 44
End: 2024-06-12
Payer: OTHER GOVERNMENT

## 2024-06-12 VITALS
BODY MASS INDEX: 32.28 KG/M2 | HEIGHT: 68 IN | TEMPERATURE: 98.4 F | RESPIRATION RATE: 18 BRPM | WEIGHT: 213 LBS | DIASTOLIC BLOOD PRESSURE: 88 MMHG | HEART RATE: 88 BPM | SYSTOLIC BLOOD PRESSURE: 140 MMHG

## 2024-06-12 DIAGNOSIS — R03.0 ELEVATED BLOOD PRESSURE READING: ICD-10-CM

## 2024-06-12 DIAGNOSIS — E29.1 HYPOGONADISM IN MALE: Primary | ICD-10-CM

## 2024-06-12 PROCEDURE — 99213 OFFICE O/P EST LOW 20 MIN: CPT | Performed by: INTERNAL MEDICINE

## 2024-06-12 PROCEDURE — 84402 ASSAY OF FREE TESTOSTERONE: CPT | Performed by: INTERNAL MEDICINE

## 2024-06-12 PROCEDURE — 36415 COLL VENOUS BLD VENIPUNCTURE: CPT | Performed by: INTERNAL MEDICINE

## 2024-06-12 PROCEDURE — 84403 ASSAY OF TOTAL TESTOSTERONE: CPT | Performed by: INTERNAL MEDICINE

## 2024-06-12 NOTE — PROGRESS NOTES
"Chief Complaint  Hypogonadism and Spasms (Transient chest, LT arm spasms)    Subjective    Abhishek Brand is a 44 y.o. male.     Abhishek Brand presents to [facility] for     History of Present Illness  The patient presents for follow-up on Zyosted injection.    The patient reports an improvement in her condition, with increased energy levels and improved overall well-being. He denies experiencing any adverse effects from the injection. He acknowledges a temporary regression in her emotions, however, he has recently regained her motivation to engage in activities. He also mentions the recent death of a friend a week ago, which was challenging for him.       The following portions of the patient's history were reviewed and updated as appropriate: allergies, current medications, past family history, past medical history, past social history, past surgical history, and problem list.    Review of Systems   All other systems reviewed and are negative.      Objective   Body mass index is 32.63 kg/m².  BMI is >= 30 and <35. (Class 1 Obesity). The following options were offered after discussion;: information on healthy weight added to patient's after visit summary        Vital Signs:   /88 (BP Location: Right arm, Patient Position: Sitting, Cuff Size: Adult)   Pulse 88   Temp 98.4 °F (36.9 °C) (Infrared)   Resp 18   Ht 172.1 cm (67.75\")   Wt 96.6 kg (213 lb)   BMI 32.63 kg/m²       Physical Exam  Patient is alert x3, non-distressed.  Head is normocephalic and atraumatic. Pupils are equal to light and accommodation. Extraocular muscles intact. Moist membranes.  Neck is supple. No cervical lymphadenopathy or goiter.  Chest is clear to auscultation. No rhonchi or wheeze.  S1, S2. No murmurs, rubs, or gallops.  Positive bowel sounds, soft, no mass, no tenderness.       Results              Assessment and Plan  Diagnoses and all orders for this visit:  Assessment & " Plan  1. Follow-up for initial trial on Zyosted.  The patient presented for a follow-up consultation for the initial trial of Zyosted, indicating a positive clinical response and clinical response. Today, we will conduct tests total and free testosterone levels.    Follow-up  The patient is scheduled for a follow-up visit in 3 months.       Diagnoses and all orders for this visit:    1. Hypogonadism in male (Primary)  -     Testosterone, Free, Total; Future    2. Elevated blood pressure reading-wanted patient to continue to monitor blood pressure readings periodically to make sure there is no side effects with the testosterone therapy.              Follow Up   No follow-ups on file.  Patient was given instructions and counseling regarding his condition or for health maintenance advice. Please see specific information pulled into the AVS if appropriate.     Patient or patient representative verbalized consent for the use of Ambient Listening during the visit with  Kush Reddy MD for chart documentation. 6/12/2024  09:43 EDT

## 2024-06-18 LAB
TESTOST FREE SERPL-MCNC: 8.3 PG/ML (ref 6.8–21.5)
TESTOST SERPL-MCNC: 559 NG/DL (ref 264–916)

## 2024-06-19 ENCOUNTER — TELEPHONE (OUTPATIENT)
Dept: INTERNAL MEDICINE | Facility: CLINIC | Age: 44
End: 2024-06-19
Payer: OTHER GOVERNMENT

## 2024-06-19 NOTE — TELEPHONE ENCOUNTER
PATIENT HAS CALLED REQUESTING A CALL BACK WITH RESULTS OF LAB WORK THAT WAS DONE LAST WEEK.    CALL BACK NUMBER [phone]

## 2024-06-19 NOTE — TELEPHONE ENCOUNTER
Please tell patient that I have reviewed his testosterone levels and his current levels are free testosterone 8.3 (range is 6.8-21)    Total testosterone is 559 (range is 264-916)      2 months ago prior to starting testosterone  Free testosterone was 1.5  Total testosterone was 110    He has made a remarkable response to the testosterone therapy.

## 2024-06-20 RX ORDER — SODIUM PICOSULFATE, MAGNESIUM OXIDE, AND ANHYDROUS CITRIC ACID 12; 3.5; 1 G/175ML; G/175ML; MG/175ML
350 LIQUID ORAL ONCE
Qty: 350 ML | Refills: 0 | Status: SHIPPED | OUTPATIENT
Start: 2024-06-20 | End: 2024-06-20

## 2024-06-20 NOTE — TELEPHONE ENCOUNTER
Called patient and read providers message below, good verb given. Please advise if patient is to continue on current testosterone dosage? He gets his next refill next week. Please notify if there are changes.

## 2024-06-25 ENCOUNTER — TELEPHONE (OUTPATIENT)
Dept: GASTROENTEROLOGY | Facility: CLINIC | Age: 44
End: 2024-06-25
Payer: OTHER GOVERNMENT

## 2024-06-25 NOTE — TELEPHONE ENCOUNTER
Colonoscopy on June 27, 2024 with Dr. Garibay.    Arrival at 9:00 A.M.    Procedure starts at 10:00 A.M.       Patient contacted office needing instructions for prep.    Advised patient how to take Clenpiq prep.  Advised clear liquids the day before surgery.  Advised no liquids 3 hours prior to arrival time.    Patient voiced understanding, and will call with any questions/concerns.

## 2024-06-27 ENCOUNTER — OUTSIDE FACILITY SERVICE (OUTPATIENT)
Dept: GASTROENTEROLOGY | Facility: CLINIC | Age: 44
End: 2024-06-27
Payer: OTHER GOVERNMENT

## 2024-06-27 PROCEDURE — G0500 MOD SEDAT ENDO SERVICE >5YRS: HCPCS | Performed by: INTERNAL MEDICINE

## 2024-06-27 PROCEDURE — G0105 COLORECTAL SCRN; HI RISK IND: HCPCS | Performed by: INTERNAL MEDICINE

## 2024-07-15 DIAGNOSIS — E29.1 HYPOGONADISM IN MALE: ICD-10-CM

## 2024-07-15 RX ORDER — TESTOSTERONE ENANTHATE 75 MG/.5ML
75 INJECTION SUBCUTANEOUS WEEKLY
Qty: 1.96 ML | Refills: 2 | Status: SHIPPED | OUTPATIENT
Start: 2024-07-15 | End: 2024-07-18 | Stop reason: SDUPTHER

## 2024-07-18 ENCOUNTER — TELEPHONE (OUTPATIENT)
Dept: INTERNAL MEDICINE | Facility: CLINIC | Age: 44
End: 2024-07-18
Payer: OTHER GOVERNMENT

## 2024-07-18 DIAGNOSIS — E29.1 HYPOGONADISM IN MALE: ICD-10-CM

## 2024-07-18 RX ORDER — TESTOSTERONE ENANTHATE 75 MG/.5ML
75 INJECTION SUBCUTANEOUS WEEKLY
Qty: 1.96 ML | Refills: 2 | Status: SHIPPED | OUTPATIENT
Start: 2024-07-18

## 2024-07-18 NOTE — TELEPHONE ENCOUNTER
Caller: Abhishek Brand    Relationship: Self    Best call back number: [phone]    Which medication are you concerned about: XYOSTED    Who prescribed you this medication: DR RODRÍGUEZ    What are your concerns: PATIENT INSURANCE WILL NOT COVER MEDICATION UNLESS IT IS SENT TO MAIL ORDER PHARMACY. PLEASE CANCEL AT [facility] AND SEND TO [facility]. PLEASE CALL PATIENT WHEN THIS HAS BEEN COMPLETED.

## 2024-07-18 NOTE — TELEPHONE ENCOUNTER
Pt notified of message from PCP Please tell patient that the Xyosted medication has been sent to [facility].   Good understanding verbalized.

## 2024-07-18 NOTE — TELEPHONE ENCOUNTER
Tried to call the [facility] but recording stated they were temporarily closed. Could not cancel medication at this time.

## 2024-07-29 DIAGNOSIS — E29.1 HYPOGONADISM IN MALE: ICD-10-CM

## 2024-07-29 RX ORDER — TESTOSTERONE ENANTHATE 75 MG/.5ML
75 INJECTION SUBCUTANEOUS WEEKLY
Qty: 1.96 ML | Refills: 2 | OUTPATIENT
Start: 2024-07-29

## 2024-07-29 NOTE — TELEPHONE ENCOUNTER
Caller: Abhishek Brand    Relationship: Self    Best call back number: [phone]     Requested Prescriptions:   Requested Prescriptions     Pending Prescriptions Disp Refills    Testosterone Enanthate (Xyosted) 75 MG/0.5ML solution auto-injector 1.96 mL 2     Sig: Inject 75 mg under the skin into the appropriate area as directed 1 (One) Time Per Week.        Pharmacy where request should be sent: [facility] [address] [phone] [place] [phone] FX     Last office visit with prescribing clinician: 6/12/2024   Last telemedicine visit with prescribing clinician: Visit date not found   Next office visit with prescribing clinician: 9/12/2024     Additional details provided by patient: PATIENT STATES THAT THE CURRENT WAY THE PRESCRIPTION IS WRITTEN FOR 1.96 ML ONLY ALLOWS HIM TO GET 3 WEEKS DOSAGE AT A TIME. IF THE PRESCRIPTION IS WRITTEN WITH A QUANTITY OF 2.0 ML HE WOULD THEN BE ABLE TO RECEIVE A ONE MONTH SUPPLY WHICH WOULD BE THE SAME PRICE. PATIENT WOULD LIKE A NEW PRESCRIPTION CALLED IN TO THE PHARMACY BEFORE THE NEXT REFILL. PATIENT WOULD ALSO LIKE TO BE ABLE TO FILL HIS PRESCRIPTION FOR 3 MONTHS AT A TIME IF HE CAN    Does the patient have less than a 3 day supply:  [] Yes  [x] No    Would you like a call back once the refill request has been completed: [x] Yes [] No    If the office needs to give you a call back, can they leave a voicemail: [] Yes [x] No    Aashish Foote   07/29/24 09:52 EDT

## 2024-08-12 DIAGNOSIS — E29.1 HYPOGONADISM IN MALE: ICD-10-CM

## 2024-08-12 RX ORDER — TESTOSTERONE ENANTHATE 75 MG/.5ML
75 INJECTION SUBCUTANEOUS WEEKLY
Qty: 1.96 ML | Refills: 2 | Status: SHIPPED | OUTPATIENT
Start: 2024-08-12

## 2024-08-12 NOTE — TELEPHONE ENCOUNTER
Caller: Abhishek Brand    Relationship: Self    Best call back number: [phone]     Requested Prescriptions:   Requested Prescriptions     Pending Prescriptions Disp Refills    Testosterone Enanthate (Xyosted) 75 MG/0.5ML solution auto-injector 1.96 mL 2     Sig: Inject 75 mg under the skin into the appropriate area as directed 1 (One) Time Per Week.        Pharmacy where request should be sent: [facility] [address] [phone] [facility] [phone]      Last office visit with prescribing clinician: 6/12/2024   Last telemedicine visit with prescribing clinician: Visit date not found   Next office visit with prescribing clinician: 9/12/2024     Additional details provided by patient: PLEASE SEND REFILLS. TOOK LAST DOSE THIS MORNING 08/12/24. PATIENT TOLD IT NEEDS DR APPROVAL. THE PRESCRIPTION SENT LAST TIME IS THE WAY HE WOULD LIKE THE PRESCRIPTION THIS TIME ALSO AS A 90 DAY SUPPLY.    Does the patient have less than a 3 day supply:  [x] Yes  [] No    Would you like a call back once the refill request has been completed: [] Yes [x] No    If the office needs to give you a call back, can they leave a voicemail: [x] Yes [] No    Aashish Holguin   08/12/24 09:38 EDT

## 2024-08-22 DIAGNOSIS — E29.1 HYPOGONADISM IN MALE: ICD-10-CM

## 2024-08-22 RX ORDER — TESTOSTERONE ENANTHATE 75 MG/.5ML
75 INJECTION SUBCUTANEOUS WEEKLY
Qty: 6.5 ML | Refills: 2 | Status: SHIPPED | OUTPATIENT
Start: 2024-08-22

## 2024-09-12 ENCOUNTER — OFFICE VISIT (OUTPATIENT)
Dept: INTERNAL MEDICINE | Facility: CLINIC | Age: 44
End: 2024-09-12
Payer: OTHER GOVERNMENT

## 2024-09-12 VITALS
RESPIRATION RATE: 16 BRPM | DIASTOLIC BLOOD PRESSURE: 70 MMHG | HEART RATE: 84 BPM | BODY MASS INDEX: 32.19 KG/M2 | TEMPERATURE: 98.7 F | WEIGHT: 210.13 LBS | SYSTOLIC BLOOD PRESSURE: 124 MMHG

## 2024-09-12 DIAGNOSIS — E29.1 HYPOGONADISM IN MALE: Primary | ICD-10-CM

## 2024-09-12 PROCEDURE — 99213 OFFICE O/P EST LOW 20 MIN: CPT | Performed by: INTERNAL MEDICINE

## 2024-09-12 NOTE — PROGRESS NOTES
Chief Complaint  Hypogonadism (Follow up)    Subjective    Abhishek Barnd is a 44 y.o. male.     Abhishek Brand presents to [facility] for     History of Present Illness  The patient presents for a checkup.    He reports feeling well overall and has an increased desire to engage in physical exercise.    He has been using two different types of pens for his medication. One of the pens has a bar that is barely noticeable. He experienced discomfort when using two pens consecutively, describing the sensation as being pricked with a pencil. He contacted his insurance company about this issue and was advised to reach out to the pen . Since then, he has not encountered any further issues.    Additionally, he mentions a discrepancy in the quantity of pens he received. He was provided with three pens instead of the expected four due to a misunderstanding about the dosage. This issue has since been resolved, and he now receives a three-month supply of pens at a cost of $20, which he finds satisfactory.       The following portions of the patient's history were reviewed and updated as appropriate: allergies, current medications, past family history, past medical history, past social history, past surgical history, and problem list.    Review of Systems   All other systems reviewed and are negative.      Objective   Body mass index is 32.19 kg/m².          Vital Signs:   /70 (BP Location: Right arm, Patient Position: Sitting, Cuff Size: Adult)   Pulse 84   Temp 98.7 °F (37.1 °C) (Temporal)   Resp 16   Wt 95.3 kg (210 lb 2 oz)   BMI 32.19 kg/m²       Physical Exam  Patient is alert x3, in no distress.  Head is normocephalic, atraumatic. Pupils are equal, light accommodation. Extraocular muscles are intact. Membranes are moist.  Neck is supple. No cervical lymphadenopathy. No goiter.  Lungs are clear to auscultation, no rhonchi, wheeze.  Heart exam shows S1,  S2, no murmurs, rubs or gallop.       Results              Assessment and Plan  Diagnoses and all orders for this visit:  Assessment & Plan  1. Hypogonadism.  He is responding positively to the injectable testosterone therapy. No adverse effects have been reported, and clinically, he has shown significant improvement on the current dosage. The current dosage of testosterone will be maintained. He will follow up in November or December 2024 for lab tests.         Diagnoses and all orders for this visit:    1. Hypogonadism in male (Primary)              Follow Up   No follow-ups on file.  Patient was given instructions and counseling regarding his condition or for health maintenance advice. Please see specific information pulled into the AVS if appropriate.     Patient or patient representative verbalized consent for the use of Ambient Listening during the visit with  Kush Reddy MD for chart documentation. 9/12/2024  10:08 EDT

## 2024-12-06 ENCOUNTER — OFFICE VISIT (OUTPATIENT)
Dept: INTERNAL MEDICINE | Facility: CLINIC | Age: 44
End: 2024-12-06
Payer: OTHER GOVERNMENT

## 2024-12-06 VITALS
DIASTOLIC BLOOD PRESSURE: 66 MMHG | HEART RATE: 92 BPM | BODY MASS INDEX: 32.6 KG/M2 | HEIGHT: 68 IN | RESPIRATION RATE: 18 BRPM | WEIGHT: 215.13 LBS | TEMPERATURE: 98.4 F | SYSTOLIC BLOOD PRESSURE: 130 MMHG

## 2024-12-06 DIAGNOSIS — Z12.5 SCREENING PSA (PROSTATE SPECIFIC ANTIGEN): ICD-10-CM

## 2024-12-06 DIAGNOSIS — Z23 NEED FOR IMMUNIZATION AGAINST INFLUENZA: Primary | ICD-10-CM

## 2024-12-06 DIAGNOSIS — F32.A ANXIETY AND DEPRESSION: ICD-10-CM

## 2024-12-06 DIAGNOSIS — F41.9 ANXIETY AND DEPRESSION: ICD-10-CM

## 2024-12-06 DIAGNOSIS — Z13.220 LIPID SCREENING: ICD-10-CM

## 2024-12-06 DIAGNOSIS — Z00.00 WELL ADULT EXAM: ICD-10-CM

## 2024-12-06 DIAGNOSIS — E29.1 HYPOGONADISM IN MALE: ICD-10-CM

## 2024-12-06 NOTE — PROGRESS NOTES
"Chief Complaint  Annual Exam    Subjective    Abhishek Brand is a 44 y.o. male.     Abhishek Brand presents to [facility] for     History of Present Illness  The patient presents for a complete adult physical.    He reports no current health concerns. He has been engaging in more physical activity recently. His weight has increased from 200 to 215 pounds.    He mentions a decline in his vision, particularly when reading, and expresses a desire to have his eyes examined.    He is currently on testosterone therapy, which he believes has improved his emotional well-being and alleviated feelings of depression. He also mentions experiencing seasonal depression.    He has undergone a colonoscopy in the past.    He has a couple of moles on his back and one on his stomach that he would like to get examined.           Complete Adult Physical          Diet: regular         Exercise: Patient is wanting to get back into some form of exercise        Social History no EtOH consumption, no IV drug use, no marijuana        Preventative Screenings  Elpvhcrtsgi-va-lv-date        Immunizations:           The following portions of the patient's history were reviewed and updated as appropriate: allergies, current medications, past family history, past medical history, past social history, past surgical history, and problem list.    Review of Systems    Objective   Body mass index is 33.18 kg/m².          Vital Signs:   /66 (BP Location: Right arm, Patient Position: Sitting, Cuff Size: Adult)   Pulse 92   Temp 98.4 °F (36.9 °C) (Temporal)   Resp 18   Ht 171.5 cm (67.52\")   Wt 97.6 kg (215 lb 2 oz)   BMI 33.18 kg/m²       Physical Exam  Patient is alert x3, in no distress.  Head is normocephalic, atraumatic. Pupils equal to light accommodation. Extraocular muscles intact. Moist membranes.  Neck is supple. No cervical lymphadenopathy. No goiter.  Lungs are clear to auscultation. No " rhonchi or wheeze.  Heart sounds S1, S2. No murmurs, rubs or gallops.  Positive bowel sounds, abdomen is soft, no masses or tenderness.  Peripheral vascular system shows +2 pulses, warm extremities, good perfusion. Musculoskeletal system shows +5 out of 5, both upper and lower extremity distribution.  Cranial nerves are intact, +2 DTR's.       Results              Assessment and Plan  Diagnoses and all orders for this visit:  Assessment & Plan  1. Hypogonadism.  He is continuing on the Xyosted testosterone replacement and reports doing well, with improvements in overall quality of life and daily activities. He mentioned that the testosterone replacement has also helped with emotional well-being. Blood work including CBC, CMP, TSH, free T4, lipid profile, PSA for prostate cancer screening, and total and free testosterone levels will be done. He will return for blood work in the next few weeks.    2. Anxiety and depression.  He reports that the testosterone replacement has significantly helped with his anxiety and depression. He feels emotionally better and attributes this improvement to the testosterone therapy.    3. Moles of concern.  He mentioned having a few moles on his back and stomach that he would like to get checked by a dermatologist. He had a similar mole on his arm previously, which was removed and found to be non-cancerous. If a referral to dermatology is needed, he will contact the office.         Diagnoses and all orders for this visit:    1. Need for immunization against influenza (Primary)    2. Well adult exam  -     CBC (No Diff); Future  -     Comprehensive Metabolic Panel; Future  -     TSH; Future  -     T4, Free; Future  -     Lipid Panel; Future    3. Hypogonadism in male  -     Testosterone, Free, Total; Future    4. Anxiety and depression    5. Lipid screening    6. Screening PSA (prostate specific antigen)  -     PSA Screen; Future              Follow Up   No follow-ups on file.  Patient  was given instructions and counseling regarding his condition or for health maintenance advice. Please see specific information pulled into the AVS if appropriate.     Patient or patient representative verbalized consent for the use of Ambient Listening during the visit with  Kush Reddy MD for chart documentation. 12/6/2024  10:46 EST

## 2024-12-17 ENCOUNTER — LAB (OUTPATIENT)
Dept: INTERNAL MEDICINE | Facility: CLINIC | Age: 44
End: 2024-12-17
Payer: OTHER GOVERNMENT

## 2024-12-17 DIAGNOSIS — Z12.5 SCREENING PSA (PROSTATE SPECIFIC ANTIGEN): ICD-10-CM

## 2024-12-17 DIAGNOSIS — E29.1 HYPOGONADISM IN MALE: ICD-10-CM

## 2024-12-17 DIAGNOSIS — Z00.00 WELL ADULT EXAM: ICD-10-CM

## 2024-12-17 LAB
ALBUMIN SERPL-MCNC: 4.7 G/DL (ref 3.5–5.2)
ALBUMIN/GLOB SERPL: 2 G/DL
ALP SERPL-CCNC: 86 U/L (ref 39–117)
ALT SERPL W P-5'-P-CCNC: 79 U/L (ref 1–41)
ANION GAP SERPL CALCULATED.3IONS-SCNC: 12 MMOL/L (ref 5–15)
AST SERPL-CCNC: 52 U/L (ref 1–40)
BILIRUB SERPL-MCNC: 0.6 MG/DL (ref 0–1.2)
BUN SERPL-MCNC: 12 MG/DL (ref 6–20)
BUN/CREAT SERPL: 8 (ref 7–25)
CALCIUM SPEC-SCNC: 9.9 MG/DL (ref 8.6–10.5)
CHLORIDE SERPL-SCNC: 99 MMOL/L (ref 98–107)
CHOLEST SERPL-MCNC: 177 MG/DL (ref 0–200)
CO2 SERPL-SCNC: 27 MMOL/L (ref 22–29)
CREAT SERPL-MCNC: 1.5 MG/DL (ref 0.76–1.27)
DEPRECATED RDW RBC AUTO: 43.8 FL (ref 37–54)
EGFRCR SERPLBLD CKD-EPI 2021: 58.1 ML/MIN/1.73
ERYTHROCYTE [DISTWIDTH] IN BLOOD BY AUTOMATED COUNT: 13.7 % (ref 12.3–15.4)
GLOBULIN UR ELPH-MCNC: 2.4 GM/DL
GLUCOSE SERPL-MCNC: 83 MG/DL (ref 65–99)
HCT VFR BLD AUTO: 48.7 % (ref 37.5–51)
HDLC SERPL-MCNC: 40 MG/DL (ref 40–60)
HGB BLD-MCNC: 16.7 G/DL (ref 13–17.7)
LDLC SERPL CALC-MCNC: 107 MG/DL (ref 0–100)
LDLC/HDLC SERPL: 2.58 {RATIO}
MCH RBC QN AUTO: 30.6 PG (ref 26.6–33)
MCHC RBC AUTO-ENTMCNC: 34.3 G/DL (ref 31.5–35.7)
MCV RBC AUTO: 89.2 FL (ref 79–97)
PLATELET # BLD AUTO: 263 10*3/MM3 (ref 140–450)
PMV BLD AUTO: 11.2 FL (ref 6–12)
POTASSIUM SERPL-SCNC: 4.4 MMOL/L (ref 3.5–5.2)
PROT SERPL-MCNC: 7.1 G/DL (ref 6–8.5)
PSA SERPL-MCNC: 0.39 NG/ML (ref 0–4)
RBC # BLD AUTO: 5.46 10*6/MM3 (ref 4.14–5.8)
SODIUM SERPL-SCNC: 138 MMOL/L (ref 136–145)
T4 FREE SERPL-MCNC: 1.25 NG/DL (ref 0.92–1.68)
TRIGL SERPL-MCNC: 170 MG/DL (ref 0–150)
TSH SERPL DL<=0.05 MIU/L-ACNC: 1.99 UIU/ML (ref 0.27–4.2)
VLDLC SERPL-MCNC: 30 MG/DL (ref 5–40)
WBC NRBC COR # BLD AUTO: 8.88 10*3/MM3 (ref 3.4–10.8)

## 2024-12-17 PROCEDURE — 84403 ASSAY OF TOTAL TESTOSTERONE: CPT | Performed by: INTERNAL MEDICINE

## 2024-12-17 PROCEDURE — 84402 ASSAY OF FREE TESTOSTERONE: CPT | Performed by: INTERNAL MEDICINE

## 2024-12-17 PROCEDURE — 36415 COLL VENOUS BLD VENIPUNCTURE: CPT | Performed by: INTERNAL MEDICINE

## 2024-12-17 PROCEDURE — G0103 PSA SCREENING: HCPCS | Performed by: INTERNAL MEDICINE

## 2024-12-17 PROCEDURE — 85027 COMPLETE CBC AUTOMATED: CPT | Performed by: INTERNAL MEDICINE

## 2024-12-17 PROCEDURE — 80053 COMPREHEN METABOLIC PANEL: CPT | Performed by: INTERNAL MEDICINE

## 2024-12-17 PROCEDURE — 80061 LIPID PANEL: CPT | Performed by: INTERNAL MEDICINE

## 2024-12-17 PROCEDURE — 84443 ASSAY THYROID STIM HORMONE: CPT | Performed by: INTERNAL MEDICINE

## 2024-12-17 PROCEDURE — 84439 ASSAY OF FREE THYROXINE: CPT | Performed by: INTERNAL MEDICINE

## 2024-12-20 LAB
TESTOST FREE SERPL-MCNC: 16.2 PG/ML (ref 6.8–21.5)
TESTOST SERPL-MCNC: 699 NG/DL (ref 264–916)

## 2025-02-12 DIAGNOSIS — E29.1 HYPOGONADISM IN MALE: ICD-10-CM

## 2025-02-12 DIAGNOSIS — Z79.899 HIGH RISK MEDICATION USE: Primary | ICD-10-CM

## 2025-02-12 RX ORDER — TESTOSTERONE ENANTHATE 75 MG/.5ML
75 INJECTION SUBCUTANEOUS WEEKLY
Qty: 6.5 ML | Refills: 2 | Status: CANCELLED | OUTPATIENT
Start: 2025-02-12

## 2025-02-12 NOTE — TELEPHONE ENCOUNTER
Caller: DELORES DAVISE    Relationship: Emergency Contact    Best call back number: [phone]     Requested Prescriptions:   Requested Prescriptions     Pending Prescriptions Disp Refills    Testosterone Enanthate (Xyosted) 75 MG/0.5ML solution auto-injector 6.5 mL 2     Sig: Inject 75 mg under the skin into the appropriate area as directed 1 (One) Time Per Week.        Pharmacy where request should be sent: [id number] [place] [address] [address] [facility] [address] - [phone]  - [phone] FX     Last office visit with prescribing clinician: 12/6/2024   Last telemedicine visit with prescribing clinician: Visit date not found   Next office visit with prescribing clinician: 4/28/2025     Additional details provided by patient: PATIENT'S PRESCRIPTION WITH THE MAIL ORDER PHARMACY WAS LOST IN THAT MAIL. PATIENT'S WIFE SPOKE WITH [facility] AND THEY DID AND EXEMPTION FOR THIS PRESCRIPTION AND TOLD HER TO GET IT FROM THE LOCAL PHARMACY SINCE THE PATIENT IS OUT OF THE MEDICATION.     Does the patient have less than a 3 day supply:  [x] Yes  [] No    Would you like a call back once the refill request has been completed: [x] Yes [] No    If the office needs to give you a call back, can they leave a voicemail: [x] Yes [] No    Aashish Doll Rep   02/12/25 13:30 EST

## 2025-02-12 NOTE — TELEPHONE ENCOUNTER
Caller: Abhishek Brand    Relationship: Self    Best call back number:       [phone] (Mobile)     Requested Prescriptions:   Requested Prescriptions     Pending Prescriptions Disp Refills    Testosterone Enanthate (Xyosted) 75 MG/0.5ML solution auto-injector 6.5 mL 2     Sig: Inject 75 mg under the skin into the appropriate area as directed 1 (One) Time Per Week.      Pharmacy where request should be sent:     [facility] [address] [phone] [facility] [phone]      Last office visit with prescribing clinician: 12/6/2024   Last telemedicine visit with prescribing clinician: Visit date not found   Next office visit with prescribing clinician: Visit date not found     Additional details provided by patient:     PATIENT STATED HE IS OUT OF THE MEDICATION    PATIENT ALSO STATED [facility] ALERTED PATIENT THAT A REFILL WAS RECEIVED RECENTLY AND IS ON THE WAY, BUT THE CHART DOESN'T REFLECT THAT REFILL HAS BEEN MADE SINCE AUGUST/2024    Does the patient have less than a 3 day supply:  [x] Yes  [] No    Would you like a call back once the refill request has been completed: [] Yes [] No    If the office needs to give you a call back, can they leave a voicemail: [] Yes [] No    Aashish Dyer Rep   02/12/25 10:39 EST

## 2025-02-12 NOTE — TELEPHONE ENCOUNTER
Last office visit (LOV) for chronic condition 12/6/2024  Next office visit (NOV) 4/28/2025  Abhishek called and notified of pending UDS and CSA placed in front office for

## 2025-02-12 NOTE — TELEPHONE ENCOUNTER
[facility] about [facility]ill, [facility] stated that patient needs to call them back and request a replacement on the last refill. Patient has been notified and will call [facility] back.

## 2025-02-13 ENCOUNTER — LAB (OUTPATIENT)
Dept: INTERNAL MEDICINE | Facility: CLINIC | Age: 45
End: 2025-02-13
Payer: OTHER GOVERNMENT

## 2025-02-13 DIAGNOSIS — Z79.899 HIGH RISK MEDICATION USE: ICD-10-CM

## 2025-02-13 RX ORDER — TESTOSTERONE ENANTHATE 75 MG/.5ML
75 INJECTION SUBCUTANEOUS WEEKLY
Qty: 6.5 ML | Refills: 2 | Status: SHIPPED | OUTPATIENT
Start: 2025-02-13

## 2025-02-14 ENCOUNTER — TELEPHONE (OUTPATIENT)
Dept: INTERNAL MEDICINE | Facility: CLINIC | Age: 45
End: 2025-02-14
Payer: OTHER GOVERNMENT

## 2025-02-14 NOTE — TELEPHONE ENCOUNTER
Caller: Abhishek Brand    Relationship: Self    Best call back number: [phone]     What was the call regarding: Testosterone Enanthate (Xyosted) 75 MG/0.5ML solution auto-injector     PATIENT STATES THAT HE DID NOT GET THE RIGHT AMOUNT OF MEDICATION, HE GOT ENOUGH FOR 3 WEEKS INSTEAD FOR 3 MONTHS. PLEASE ADVISE.     Is it okay if the provider responds through MyChart:

## 2025-02-17 NOTE — TELEPHONE ENCOUNTER
Can you please call pharmacy to see what the right quantity volume amount needs to be for a 3-month supply for patient?

## 2025-02-17 NOTE — TELEPHONE ENCOUNTER
Spoke to the patient and he stated the pharmacy gave him an old script that was still on file and had refills remaining of his testosterone. The pharmacy did not have the recent script when patient got a call that he had a medication ready for . I let patient know that Dr. Reddy sent in a refill on 02/13/2025 that is the 3 month supply.    Spoke with pharmacy and they confirmed they got the 3 month supply refill. The dosage and quantity is correct for the 3 month supply.    No further issues or concerns with pharmacy or patient.

## 2025-02-21 LAB — DRUGS UR: NORMAL

## 2025-02-26 ENCOUNTER — TELEPHONE (OUTPATIENT)
Dept: INTERNAL MEDICINE | Facility: CLINIC | Age: 45
End: 2025-02-26
Payer: OTHER GOVERNMENT

## 2025-02-26 NOTE — TELEPHONE ENCOUNTER
The [facility] needs [facility] and needs to be 90 day supply please. They didn't get the medication at [facility]

## 2025-02-26 NOTE — TELEPHONE ENCOUNTER
Caller: Abhisehk Brand    Relationship: Self    Best call back number: [phone]     What is the best time to reach you: ANYTIME    Who are you requesting to speak with (clinical staff, provider,  specific staff member): CLINICAL STAFF    What was the call regarding: PATIENT WANTS TO FOLLOW UP ON THE STATUS OF HIS XYOSTED.

## 2025-02-27 DIAGNOSIS — E29.1 HYPOGONADISM IN MALE: ICD-10-CM

## 2025-02-27 RX ORDER — TESTOSTERONE ENANTHATE 75 MG/.5ML
75 INJECTION SUBCUTANEOUS WEEKLY
Qty: 6.5 ML | Refills: 3 | Status: SHIPPED | OUTPATIENT
Start: 2025-02-27

## 2025-04-28 ENCOUNTER — OFFICE VISIT (OUTPATIENT)
Dept: INTERNAL MEDICINE | Facility: CLINIC | Age: 45
End: 2025-04-28
Payer: OTHER GOVERNMENT

## 2025-04-28 VITALS
BODY MASS INDEX: 33.77 KG/M2 | SYSTOLIC BLOOD PRESSURE: 122 MMHG | TEMPERATURE: 98.6 F | WEIGHT: 219 LBS | RESPIRATION RATE: 18 BRPM | HEART RATE: 76 BPM | DIASTOLIC BLOOD PRESSURE: 80 MMHG

## 2025-04-28 DIAGNOSIS — F32.A ANXIETY AND DEPRESSION: Primary | ICD-10-CM

## 2025-04-28 DIAGNOSIS — E29.1 HYPOGONADISM IN MALE: ICD-10-CM

## 2025-04-28 DIAGNOSIS — F41.9 ANXIETY AND DEPRESSION: Primary | ICD-10-CM

## 2025-04-28 PROCEDURE — 99214 OFFICE O/P EST MOD 30 MIN: CPT | Performed by: INTERNAL MEDICINE

## 2025-04-28 PROCEDURE — 84403 ASSAY OF TOTAL TESTOSTERONE: CPT | Performed by: INTERNAL MEDICINE

## 2025-04-28 PROCEDURE — 36415 COLL VENOUS BLD VENIPUNCTURE: CPT | Performed by: INTERNAL MEDICINE

## 2025-04-28 PROCEDURE — 84402 ASSAY OF FREE TESTOSTERONE: CPT | Performed by: INTERNAL MEDICINE

## 2025-04-28 RX ORDER — PROPRANOLOL HYDROCHLORIDE 10 MG/1
10 TABLET ORAL 2 TIMES DAILY
Qty: 60 TABLET | Refills: 4 | Status: SHIPPED | OUTPATIENT
Start: 2025-04-28

## 2025-04-28 NOTE — PROGRESS NOTES
Chief Complaint  Med Refill (Follow up)    Subjective    Abhishek Brand is a 45 y.o. male.     Abhishek Brand presents to [facility] for     History of Present Illness  The patient is a 45-year-old male who presents for evaluation of anxiety.    He has been grappling with the loss of his daughter's grandfather, which occurred approximately 4 to 5 months ago. This event has led to a series of challenges, including difficulties in returning his daughter to her mother's residence. Therapeutic support has been sought, but his therapist is currently on maternity leave, with their last session occurring 3 weeks prior. Over the past week, an improvement in anxiety symptoms has been noted, which initially presented as hot flashes upon waking. These symptoms were reminiscent of those experienced when he first began testosterone therapy, leading to questions about whether they were related to his body's adjustment to the medication.     Sleep has improved over the past week, attributed to a reduction in caffeine intake and increased physical activity. No thoughts of self-harm are reported. A history of anxiety dating back 15 years is noted, during which severe panic attacks were experienced. Previous prescriptions for Celexa and Zoloft were discontinued due to increased feelings of depression. Resuming gym workouts is being considered, with a belief that current symptoms may be due to a lack of physical activity and excessive lethargy. Episodes of waking up with a racing heart and shortness of breath are reported. Testosterone therapy is ongoing, which may be contributing to symptoms.       The following portions of the patient's history were reviewed and updated as appropriate: allergies, current medications, past family history, past medical history, past social history, past surgical history, and problem list.    Review of Systems   Psychiatric/Behavioral:  Positive for sleep  disturbance. Negative for decreased concentration, dysphoric mood, hallucinations, self-injury, negative for hyperactivity, depressed mood and stress. The patient is nervous/anxious.    All other systems reviewed and are negative.      Objective   Body mass index is 33.77 kg/m².          Vital Signs:   /80 (BP Location: Right arm, Patient Position: Sitting, Cuff Size: Large Adult)   Pulse 76   Temp 98.6 °F (37 °C) (Temporal)   Resp 18   Wt 99.3 kg (219 lb)   BMI 33.77 kg/m²       Physical Exam  General: Somewhat anxious but composed, minimal distress.  Respiratory: Clear to auscultation, no wheezing, rales or rhonchi  Cardiovascular: Regular rate and rhythm, no murmurs, rubs, or gallops  Extremities: +2 pulses, warm extremities, good perfusion       Results              Assessment and Plan  Diagnoses and all orders for this visit:  Spent 35 minutes face to face with patient discussing medical management of mental health with patient   Assessment & Plan  1. Anxiety.  - Reports increased anxiety, particularly in the context of recent stressors involving his daughter. Symptoms include waking up with hot flashes, racing heart, and difficulty returning to sleep.  - Physical exam findings: somewhat anxious, minimal distress, chest clear to auscultation, cardiac exam S1, S2, no murmurs, rubs, or gallop, peripheral vascular +2 pulses, warm extremities, good perfusion.  - History of anxiety and panic attacks noted; current episode feels different. Managing by reducing caffeine intake and increasing physical activity.  - Pharmacogenetic testing and testosterone test will be conducted to identify effective medication and assess testosterone levels. Results expected within a week. Will wait for results before considering new medications.  Patient does not pose any threats towards himself or anybody else and is okay with waiting until the primary genetics testing in order to initiate medication if needed.        Diagnoses and all orders for this visit:    1. Anxiety and depression (Primary)  -     propranolol (INDERAL) 10 MG tablet; Take 1 tablet by mouth 2 (Two) Times a Day. for anxiety  Dispense: 60 tablet; Refill: 4  -     [facility] - Swab,; Future    2. Hypogonadism in male  -     Testosterone, Free, Total; Future              Follow Up   Return in about 7 months (around 12/6/2025) for Annual physical.  Patient was given instructions and counseling regarding his condition or for health maintenance advice. Please see specific information pulled into the AVS if appropriate.     Patient or patient representative verbalized consent for the use of Ambient Listening during the visit with  Kush Reddy MD for chart documentation. 4/28/2025  10:12 EDT

## 2025-04-29 ENCOUNTER — TELEPHONE (OUTPATIENT)
Dept: INTERNAL MEDICINE | Facility: CLINIC | Age: 45
End: 2025-04-29
Payer: OTHER GOVERNMENT

## 2025-04-29 NOTE — TELEPHONE ENCOUNTER
Xyosted 75MG/0.5ML auto-injectors  [id number]  PA sent to cover my meds   Drug is covered by current benefit plan. No further PA activity needed  [facility] Pharmacist notified

## 2025-05-01 LAB
TESTOST FREE SERPL-MCNC: 11.5 PG/ML (ref 6.8–21.5)
TESTOST SERPL-MCNC: 629 NG/DL (ref 264–916)

## 2025-05-05 ENCOUNTER — RESULTS FOLLOW-UP (OUTPATIENT)
Dept: INTERNAL MEDICINE | Facility: CLINIC | Age: 45
End: 2025-05-05
Payer: OTHER GOVERNMENT

## 2025-05-06 ENCOUNTER — TELEPHONE (OUTPATIENT)
Dept: INTERNAL MEDICINE | Facility: CLINIC | Age: 45
End: 2025-05-06
Payer: OTHER GOVERNMENT

## 2025-05-20 ENCOUNTER — TELEPHONE (OUTPATIENT)
Dept: INTERNAL MEDICINE | Facility: CLINIC | Age: 45
End: 2025-05-20
Payer: OTHER GOVERNMENT

## 2025-05-20 DIAGNOSIS — E29.1 HYPOGONADISM IN MALE: ICD-10-CM

## 2025-05-20 NOTE — TELEPHONE ENCOUNTER
Caller: NILSA DAVIS    Relationship: Emergency Contact    Best call back number: [phone]     What was the call regarding: [facility] NEEDS A PRIOR AUTH FOR XYOSTED.    Is it okay if the provider responds through MyChart: NO

## 2025-05-21 RX ORDER — TESTOSTERONE ENANTHATE 75 MG/.5ML
75 INJECTION SUBCUTANEOUS WEEKLY
Qty: 6.5 ML | Refills: 3 | Status: SHIPPED | OUTPATIENT
Start: 2025-05-21

## 2025-05-23 NOTE — TELEPHONE ENCOUNTER
Wife called back. Spoke with nurse. PA submitted but not working through Cover My Meds.     Wife informed that they need to contact . Wife stated that she had called  and they do not do the authorization for this medication.     Wife also called [employer] and was informed that they had requested additional information from us. [facility] reference number is [id number].

## 2025-05-23 NOTE — TELEPHONE ENCOUNTER
Caller: NILSA DAVIS    Relationship: Emergency Contact    Best call back number: [phone]     Who are you requesting to speak with (clinical staff, provider,  specific staff member): CLINICAL      What was the call regarding: CHECKING STATUS    Is it okay if the provider responds through MyChart:

## 2025-05-27 DIAGNOSIS — E29.1 HYPOGONADISM IN MALE: ICD-10-CM

## 2025-05-27 RX ORDER — TESTOSTERONE ENANTHATE 75 MG/.5ML
75 INJECTION SUBCUTANEOUS WEEKLY
Qty: 6.5 ML | Refills: 3 | OUTPATIENT
Start: 2025-05-27

## 2025-05-27 NOTE — TELEPHONE ENCOUNTER
Caller: Abhishek Brand    Relationship: Self    Best call back number: [phone]     Requested Prescriptions:   Requested Prescriptions     Pending Prescriptions Disp Refills    Testosterone Enanthate (Xyosted) 75 MG/0.5ML solution auto-injector 6.5 mL 3     Sig: Inject 75 mg under the skin into the appropriate area as directed 1 (One) Time Per Week.        Pharmacy where request should be sent: [id number] [place], [place] - [facility] [address] [facility] [address] - [phone]  - [phone] FX     Last office visit with prescribing clinician: 4/28/2025   Last telemedicine visit with prescribing clinician: Visit date not found   Next office visit with prescribing clinician: 12/9/2025     Additional details provided by patient: PATIENT IS OUT OF MEDICATION AND [facility] HAS IT ALL MESSED OUT. PATIENT WANTS IT SENT IN TO A LOCAL PHARMACY.    Does the patient have less than a 3 day supply:  [x] Yes  [] No    Would you like a call back once the refill request has been completed: [x] Yes [] No    If the office needs to give you a call back, can they leave a voicemail: [x] Yes [] No    Aashish Bhatia Rep   05/27/25 09:21 EDT

## 2025-05-28 ENCOUNTER — TELEPHONE (OUTPATIENT)
Dept: INTERNAL MEDICINE | Facility: CLINIC | Age: 45
End: 2025-05-28
Payer: OTHER GOVERNMENT

## 2025-05-28 NOTE — TELEPHONE ENCOUNTER
Caller: Abhishek Brand    Relationship to patient: Self      Best call back number: [phone]      Medication PA needed: XYOSTED 75 MG/0.5ML AUTO INJECTOR    Reason for call/Prior Auth: INSURANCE IS REQUIRING PRIOR AUTHORIZATION FOR [employer] IN [place]. PATIENT IS GOING OUT OF TOWN ON FRIDAY. [employer] WAS TO FAX PAPERWORK TO OFFICE.

## 2025-05-29 NOTE — TELEPHONE ENCOUNTER
[facility]    ([facility]

## 2025-05-30 ENCOUNTER — TELEPHONE (OUTPATIENT)
Dept: INTERNAL MEDICINE | Facility: CLINIC | Age: 45
End: 2025-05-30
Payer: OTHER GOVERNMENT

## 2025-05-30 DIAGNOSIS — E29.1 HYPOGONADISM IN MALE: ICD-10-CM

## 2025-05-30 NOTE — TELEPHONE ENCOUNTER
Caller: Sunday Abhishek    Relationship: Self    Best call back number: [phone]     Requested Prescriptions:   Requested Prescriptions     Pending Prescriptions Disp Refills    Testosterone Enanthate (Xyosted) 75 MG/0.5ML solution auto-injector 6.5 mL 3     Sig: Inject 75 mg under the skin into the appropriate area as directed 1 (One) Time Per Week.        Pharmacy where request should be sent: [id number] - [place], [place] - [address] AT [facility] ([facility] & [address] [phone] [facility] [phone] FX     Last office visit with prescribing clinician: 4/28/2025   Last telemedicine visit with prescribing clinician: Visit date not found   Next office visit with prescribing clinician: 12/9/2025     Additional details provided by patient: PATIENT WOULD LIKE FOR MEDICATION TO BE SENT INTO THE [facility] PHARMACY INSTEAD OF [facility] TO BE ABLE TO GET MEDICATION FILLED AS SOON AS POSSIBLE DUE TO LEAVING OUT OF TOWN TODAY 05/30/2025    MEDICATION NEEDS THE APPROVED PRIOR AUTHORIZATION TO BE SENT WITH THE PRESCRIPTION FOR INSURANCE TO COVER       Does the patient have less than a 3 day supply:  [x] Yes  [] No    Would you like a call back once the refill request has been completed: [] Yes [x] No    If the office needs to give you a call back, can they leave a voicemail: [] Yes [x] No    Aashish Villarreal Rep   05/30/25 11:47 EDT

## 2025-05-30 NOTE — TELEPHONE ENCOUNTER
Spoke with patient letting him know the PA form for his testosterone was faxed over with confirmation success sheet yesterday, 05/29/2025. Patient stated he has been having issues with his insurance. Patient stated he will call them back.

## 2025-05-30 NOTE — TELEPHONE ENCOUNTER
Caller: Abhishek Brand    Relationship: Self    Best call back number: [phone]     What is the best time to reach you: ANYTIME    Who are you requesting to speak with (clinical staff, provider,  specific staff member): CLINICAL STAFF    What was the call regarding: PATIENT WAS TOLD THAT A PRIOR AUTHORIZATION WAS NEEDED FOR THE XYOSTED, HE REQUESTED THE PRIOR AUTHORIZATION AND WAS TOLD EVERYTHING WOULD BE COMPLETED ON 05/30/25. HE STATED THAT THE PRIOR AUTHORIZATION WAS FOR A MEDICATION THAT HE IS NOT ON NOR HAS HE TAKEN IT.

## 2025-06-02 DIAGNOSIS — E29.1 HYPOGONADISM IN MALE: ICD-10-CM

## 2025-06-02 RX ORDER — TESTOSTERONE ENANTHATE 75 MG/.5ML
75 INJECTION SUBCUTANEOUS WEEKLY
Qty: 6.5 ML | Refills: 3 | OUTPATIENT
Start: 2025-06-02

## 2025-06-02 NOTE — TELEPHONE ENCOUNTER
Caller: Abhishek Brand    Relationship: Self    Best call back number: [phone]     Requested Prescriptions:   Requested Prescriptions     Pending Prescriptions Disp Refills    Testosterone Enanthate (Xyosted) 75 MG/0.5ML solution auto-injector 6.5 mL 3     Sig: Inject 75 mg under the skin into the appropriate area as directed 1 (One) Time Per Week.        [facility] be sent[id number] - [facility] [address] AT [facility] [phone] [facility] [phone] FX     Last office visit with prescribing clinician: 4/28/2025   Last telemedicine visit with prescribing clinician: Visit date not found   Next office visit with prescribing clinician: 12/9/2025     Additional details provided by patient: PATIENT HAS BEEN WITHOUT THIS MEDICATION FOR OVER 2 WEEKS.     Does the patient have less than a 3 day supply:  [x] Yes  [] No    Would you like a call back once the refill request has been completed: [] Yes [x] No    If the office needs to give you a call back, can they leave a voicemail: [] Yes [x] No    Aashish Sultana   06/02/25 09:52 EDT

## 2025-06-05 ENCOUNTER — TELEPHONE (OUTPATIENT)
Dept: INTERNAL MEDICINE | Facility: CLINIC | Age: 45
End: 2025-06-05
Payer: OTHER GOVERNMENT

## 2025-06-05 NOTE — TELEPHONE ENCOUNTER
Left voice message for patient to return call.    Relay: Has patient been able to  prescription? Are they still having issues with their insurance?

## 2025-06-05 NOTE — TELEPHONE ENCOUNTER
----- Message from Kush Reddy sent at 2025  6:04 AM EDT -----  Regarding: Testosterone replacement  Can we see why patient is having difficulty with getting the Xyosted through his current pharmacy?  Issues with supply availability, other pharmacies having  supplies.  This is not acceptable as patient has been receiving this product and done well on a consistent basis.

## 2025-06-10 NOTE — TELEPHONE ENCOUNTER
Name: Abhishek Brand    Relationship: Self    Best Callback Number: [phone]     HUB PROVIDED THE RELAY MESSAGE FROM THE OFFICE   PATIENT VOICED UNDERSTANDING AND HAS NO FURTHER QUESTIONS AT THIS TIME    PATIENT STATED THAT HE IS BACK IN TOWN AND HAS GOTTEN HIS MEDICATIONS.

## 2025-06-10 NOTE — TELEPHONE ENCOUNTER
Left voice message for patient to return call.    Relay: Is patient still out of town? If not, has patient received his medications?

## 2025-08-04 ENCOUNTER — TELEMEDICINE (OUTPATIENT)
Dept: INTERNAL MEDICINE | Facility: CLINIC | Age: 45
End: 2025-08-04
Payer: OTHER GOVERNMENT

## 2025-08-04 DIAGNOSIS — F41.9 ANXIETY AND DEPRESSION: Primary | ICD-10-CM

## 2025-08-04 DIAGNOSIS — F32.A ANXIETY AND DEPRESSION: Primary | ICD-10-CM

## 2025-08-04 PROCEDURE — 99214 OFFICE O/P EST MOD 30 MIN: CPT | Performed by: INTERNAL MEDICINE

## 2025-08-07 DIAGNOSIS — E29.1 HYPOGONADISM IN MALE: ICD-10-CM

## 2025-08-08 RX ORDER — TESTOSTERONE ENANTHATE 75 MG/.5ML
75 INJECTION SUBCUTANEOUS WEEKLY
Qty: 6.5 ML | Refills: 3 | Status: SHIPPED | OUTPATIENT
Start: 2025-08-08

## 2025-08-21 ENCOUNTER — TELEPHONE (OUTPATIENT)
Dept: INTERNAL MEDICINE | Facility: CLINIC | Age: 45
End: 2025-08-21
Payer: OTHER GOVERNMENT